# Patient Record
Sex: FEMALE | Race: WHITE | NOT HISPANIC OR LATINO | Employment: FULL TIME | URBAN - METROPOLITAN AREA
[De-identification: names, ages, dates, MRNs, and addresses within clinical notes are randomized per-mention and may not be internally consistent; named-entity substitution may affect disease eponyms.]

---

## 2018-01-21 ENCOUNTER — OFFICE VISIT (OUTPATIENT)
Dept: URGENT CARE | Facility: CLINIC | Age: 38
End: 2018-01-21
Payer: COMMERCIAL

## 2018-01-21 VITALS
RESPIRATION RATE: 18 BRPM | WEIGHT: 160 LBS | TEMPERATURE: 98 F | SYSTOLIC BLOOD PRESSURE: 107 MMHG | BODY MASS INDEX: 25.11 KG/M2 | OXYGEN SATURATION: 100 % | HEIGHT: 67 IN | DIASTOLIC BLOOD PRESSURE: 69 MMHG | HEART RATE: 89 BPM

## 2018-01-21 DIAGNOSIS — B34.9 VIRAL SYNDROME: ICD-10-CM

## 2018-01-21 DIAGNOSIS — R50.9 FEVER, UNSPECIFIED FEVER CAUSE: Primary | ICD-10-CM

## 2018-01-21 LAB
CTP QC/QA: YES
FLUAV AG NPH QL: NEGATIVE
FLUBV AG NPH QL: NEGATIVE

## 2018-01-21 PROCEDURE — 99203 OFFICE O/P NEW LOW 30 MIN: CPT | Mod: S$GLB,,, | Performed by: EMERGENCY MEDICINE

## 2018-01-21 PROCEDURE — 87804 INFLUENZA ASSAY W/OPTIC: CPT | Mod: 59,QW,S$GLB, | Performed by: EMERGENCY MEDICINE

## 2018-01-21 RX ORDER — ACETAMINOPHEN 325 MG/1
325 TABLET ORAL EVERY 6 HOURS PRN
COMMUNITY

## 2018-01-21 NOTE — PROGRESS NOTES
Subjective:       Patient ID: Nannette Dimas is a 37 y.o. female.    Vitals:    01/21/18 1006   BP: 107/69   Pulse: 89   Resp: 18   Temp: 98 °F (36.7 °C)       Chief Complaint: Fever (99.6 last night )    38.5 WEEKS PREGNANT, JUST WANTED TO MAKE SURE NOT FLU, AS SHE WOKE UP WITH 99.5 DEGREE TEMP, MILD BODY ACHES, NOT MUCH ELSE      Fever    This is a new problem. The current episode started in the past 7 days. The problem occurs constantly. The problem has been gradually worsening. The maximum temperature noted was 99 to 99.9 F. Associated symptoms include headaches and muscle aches. Pertinent negatives include no abdominal pain, chest pain, congestion, coughing, ear pain, nausea, sore throat or wheezing. She has tried acetaminophen for the symptoms. The treatment provided mild relief.     Review of Systems   Constitution: Positive for chills, fever, weakness and malaise/fatigue.   HENT: Negative for congestion, ear pain, hoarse voice and sore throat.    Eyes: Negative for discharge and redness.   Cardiovascular: Negative for chest pain, dyspnea on exertion and leg swelling.   Respiratory: Negative for cough, shortness of breath, sputum production and wheezing.    Musculoskeletal: Negative for myalgias.   Gastrointestinal: Negative for abdominal pain and nausea.   Neurological: Positive for headaches.       Objective:      Physical Exam   Constitutional: She is oriented to person, place, and time. She appears well-developed and well-nourished. She is cooperative.  Non-toxic appearance. She does not appear ill. No distress.   HENT:   Head: Normocephalic and atraumatic.   Right Ear: Hearing, tympanic membrane, external ear and ear canal normal.   Left Ear: Hearing, tympanic membrane, external ear and ear canal normal.   Nose: Nose normal. No mucosal edema, rhinorrhea or nasal deformity. No epistaxis. Right sinus exhibits no maxillary sinus tenderness and no frontal sinus tenderness. Left sinus exhibits no  maxillary sinus tenderness and no frontal sinus tenderness.   Mouth/Throat: Uvula is midline, oropharynx is clear and moist and mucous membranes are normal. No trismus in the jaw. Normal dentition. No uvula swelling. No posterior oropharyngeal erythema.   Eyes: Conjunctivae and lids are normal. No scleral icterus.   Sclera clear bilat   Neck: Trachea normal, full passive range of motion without pain and phonation normal. Neck supple.   Cardiovascular: Normal rate, regular rhythm, normal heart sounds, intact distal pulses and normal pulses.    Pulmonary/Chest: Effort normal and breath sounds normal. No respiratory distress.   Abdominal: Soft. Normal appearance and bowel sounds are normal. She exhibits no distension. There is no tenderness.   Musculoskeletal: Normal range of motion. She exhibits no edema or deformity.   Neurological: She is alert and oriented to person, place, and time. She exhibits normal muscle tone. Coordination normal.   Skin: Skin is warm, dry and intact. She is not diaphoretic. No pallor.   Psychiatric: She has a normal mood and affect. Her speech is normal and behavior is normal. Judgment and thought content normal. Cognition and memory are normal.   Nursing note and vitals reviewed.        Office Visit on 01/21/2018   Component Date Value Ref Range Status    Rapid Influenza A Ag 01/21/2018 Negative  Negative Final    Rapid Influenza B Ag 01/21/2018 Negative  Negative Final     Acceptable 01/21/2018 Yes   Final       Assessment:       1. Fever, unspecified fever cause    2. Viral syndrome        Plan:       Nannette was seen today for fever.    Diagnoses and all orders for this visit:    Fever, unspecified fever cause  -     POCT Influenza A/B    Viral syndrome          Patient Instructions     Viral Syndrome (Adult)  A viral illness may cause a number of symptoms. The symptoms depend on the part of the body that the virus affects. If it settles in your nose, throat, and  "lungs, it may cause cough, sore throat, congestion, and sometimes headache. If it settles in your stomach and intestinal tract, it may cause vomiting and diarrhea. Sometimes it causes vague symptoms like "aching all over," feeling tired, loss of appetite, or fever.  A viral illness usually lasts 1 to 2 weeks, but sometimes it lasts longer. In some cases, a more serious infection can look like a viral syndrome in the first few days of the illness. You may need another exam and additional tests to know the difference. Watch for the warning signs listed below.  Home care  Follow these guidelines for taking care of yourself at home:  · If symptoms are severe, rest at home for the first 2 to 3 days.  · Stay away from cigarette smoke - both your smoke and the smoke from others.  · You may use over-the-counter acetaminophen or ibuprofen for fever, muscle aching, and headache, unless another medicine was prescribed for this. If you have chronic liver or kidney disease or ever had a stomach ulcer or GI bleeding, talk with your doctor before using these medicines. No one who is younger than 18 and ill with a fever should take aspirin. It may cause severe disease or death.  · Your appetite may be poor, so a light diet is fine. Avoid dehydration by drinking 8 to 12 8-ounce glasses of fluids each day. This may include water; orange juice; lemonade; apple, grape, and cranberry juice; clear fruit drinks; electrolyte replacement and sports drinks; and decaffeinated teas and coffee. If you have been diagnosed with a kidney disease, ask your doctor how much and what types of fluids you should drink to prevent dehydration. If you have kidney disease, drinking too much fluid can cause it build up in the your body and be dangerous to your health.  · Over-the-counter remedies won't shorten the length of the illness but may be helpful for cough, sore throat; and nasal and sinus congestion. Don't use decongestants if you have high blood " pressure.  Follow-up care  Follow up with your healthcare provider if you do not improve over the next week.  Call 911  Get emergency medical care if any of the following occur:  · Convulsion  · Feeling weak, dizzy, or like you are going to faint  · Chest pain, shortness of breath, wheezing, or difficulty breathing  When to seek medical advice  Call your healthcare provider right away if any of these occur:  · Cough with lots of colored sputum (mucus) or blood in your sputum  · Chest pain, shortness of breath, wheezing, or difficulty breathing  · Severe headache; face, neck, or ear pain  · Severe, constant pain in the lower right side of your belly (abdominal)  · Continued vomiting (cant keep liquids down)  · Frequent diarrhea (more than 5 times a day); blood (red or black color) or mucus in diarrhea  · Feeling weak, dizzy, or like you are going to faint  · Extreme thirst  · Fever of 100.4°F (38°C) or higher, or as directed by your healthcare provider  Date Last Reviewed: 9/25/2015 © 2000-2017 [employer]. [address] [place] [address], [place] [id number]. All rights reserved. This information is not intended as a substitute for professional medical care. Always follow your healthcare professional's instructions.      REST  HYDRATE  TYLENOL FOR ACHES, LOW GRADE TEMP  APPROVED MEDS BY OBGYN LIKE CLARITIN OR MUCINEX FOR CONGESTION  FLU SWAB NEGATIVE  RETURN FOR ANY CONCERNS OR PROBLEMS  SEE VIRAL SYNDROME SHEET

## 2018-01-21 NOTE — PATIENT INSTRUCTIONS
"  Viral Syndrome (Adult)  A viral illness may cause a number of symptoms. The symptoms depend on the part of the body that the virus affects. If it settles in your nose, throat, and lungs, it may cause cough, sore throat, congestion, and sometimes headache. If it settles in your stomach and intestinal tract, it may cause vomiting and diarrhea. Sometimes it causes vague symptoms like "aching all over," feeling tired, loss of appetite, or fever.  A viral illness usually lasts 1 to 2 weeks, but sometimes it lasts longer. In some cases, a more serious infection can look like a viral syndrome in the first few days of the illness. You may need another exam and additional tests to know the difference. Watch for the warning signs listed below.  Home care  Follow these guidelines for taking care of yourself at home:  · If symptoms are severe, rest at home for the first 2 to 3 days.  · Stay away from cigarette smoke - both your smoke and the smoke from others.  · You may use over-the-counter acetaminophen or ibuprofen for fever, muscle aching, and headache, unless another medicine was prescribed for this. If you have chronic liver or kidney disease or ever had a stomach ulcer or GI bleeding, talk with your doctor before using these medicines. No one who is younger than 18 and ill with a fever should take aspirin. It may cause severe disease or death.  · Your appetite may be poor, so a light diet is fine. Avoid dehydration by drinking 8 to 12 8-ounce glasses of fluids each day. This may include water; orange juice; lemonade; apple, grape, and cranberry juice; clear fruit drinks; electrolyte replacement and sports drinks; and decaffeinated teas and coffee. If you have been diagnosed with a kidney disease, ask your doctor how much and what types of fluids you should drink to prevent dehydration. If you have kidney disease, drinking too much fluid can cause it build up in the your body and be dangerous to your " health.  · Over-the-counter remedies won't shorten the length of the illness but may be helpful for cough, sore throat; and nasal and sinus congestion. Don't use decongestants if you have high blood pressure.  Follow-up care  Follow up with your healthcare provider if you do not improve over the next week.  Call 911  Get emergency medical care if any of the following occur:  · Convulsion  · Feeling weak, dizzy, or like you are going to faint  · Chest pain, shortness of breath, wheezing, or difficulty breathing  When to seek medical advice  Call your healthcare provider right away if any of these occur:  · Cough with lots of colored sputum (mucus) or blood in your sputum  · Chest pain, shortness of breath, wheezing, or difficulty breathing  · Severe headache; face, neck, or ear pain  · Severe, constant pain in the lower right side of your belly (abdominal)  · Continued vomiting (cant keep liquids down)  · Frequent diarrhea (more than 5 times a day); blood (red or black color) or mucus in diarrhea  · Feeling weak, dizzy, or like you are going to faint  · Extreme thirst  · Fever of 100.4°F (38°C) or higher, or as directed by your healthcare provider  Date Last Reviewed: 9/25/2015  © 0847-8976 [employer]. [address] [place] [address], [place] [address]. All rights reserved. This information is not intended as a substitute for professional medical care. Always follow your healthcare professional's instructions.      REST  HYDRATE  TYLENOL FOR ACHES, LOW GRADE TEMP  APPROVED MEDS BY OBGYN LIKE CLARITIN OR MUCINEX FOR CONGESTION  FLU SWAB NEGATIVE  RETURN FOR ANY CONCERNS OR PROBLEMS  SEE VIRAL SYNDROME SHEET

## 2018-01-24 ENCOUNTER — OFFICE VISIT (OUTPATIENT)
Dept: URGENT CARE | Facility: CLINIC | Age: 38
End: 2018-01-24
Payer: COMMERCIAL

## 2018-01-24 VITALS
HEART RATE: 103 BPM | WEIGHT: 160 LBS | OXYGEN SATURATION: 97 % | DIASTOLIC BLOOD PRESSURE: 79 MMHG | RESPIRATION RATE: 16 BRPM | SYSTOLIC BLOOD PRESSURE: 112 MMHG | BODY MASS INDEX: 25.11 KG/M2 | HEIGHT: 67 IN | TEMPERATURE: 97 F

## 2018-01-24 DIAGNOSIS — J10.1 INFLUENZA B: Primary | ICD-10-CM

## 2018-01-24 LAB
CTP QC/QA: YES
FLUAV AG NPH QL: NEGATIVE
FLUBV AG NPH QL: POSITIVE

## 2018-01-24 PROCEDURE — 99214 OFFICE O/P EST MOD 30 MIN: CPT | Mod: S$GLB,,, | Performed by: EMERGENCY MEDICINE

## 2018-01-24 PROCEDURE — 87804 INFLUENZA ASSAY W/OPTIC: CPT | Mod: 59,QW,S$GLB, | Performed by: EMERGENCY MEDICINE

## 2018-01-24 RX ORDER — TETANUS TOXOID, REDUCED DIPHTHERIA TOXOID AND ACELLULAR PERTUSSIS VACCINE, ADSORBED 5; 2.5; 8; 8; 2.5 [IU]/.5ML; [IU]/.5ML; UG/.5ML; UG/.5ML; UG/.5ML
SUSPENSION INTRAMUSCULAR
Refills: 0 | COMMUNITY
Start: 2018-01-02

## 2018-01-24 RX ORDER — OSELTAMIVIR PHOSPHATE 75 MG/1
75 CAPSULE ORAL 2 TIMES DAILY
Qty: 10 CAPSULE | Refills: 0 | Status: SHIPPED | OUTPATIENT
Start: 2018-01-24 | End: 2018-01-29

## 2018-01-24 NOTE — PATIENT INSTRUCTIONS
SEE FLU SHEET  FLU B POSITIVE  TYLENOL 650 MG EVERY 4-6 HOURS FOR FEVER/ACHES  CLARITIN FOR CONGESTION  OVER THE COUNTER MUCINEX  TWICE DAILY FOR COUGH  TAMIFLU RX  RETURN FOR ANY CONCERNS OR PROBLEMS  SEE FLU SHEET        Influenza (Adult)    Influenza is also called the flu. It is a viral illness that affects the air passages of your lungs. It is different from the common cold. The flu can easily be passed from one to person to another. It may be spread through the air by coughing and sneezing. Or it can be spread by touching the sick person and then touching your own eyes, nose, or mouth.  The flu starts 1 to 3 days after you are exposed to the flu virus. It may last for 1 to 2 weeks but many people feel tired or fatigued for many weeks afterward. You usually dont need to take antibiotics unless you have a complication. This might be an ear or sinus infection or pneumonia.  Symptoms of the flu may be mild or severe. They can include extreme tiredness (wanting to stay in bed all day), chills, fevers, muscle aches, soreness with eye movement, headache, and a dry, hacking cough.  Home care  Follow these guidelines when caring for yourself at home:  · Avoid being around cigarette smoke, whether yours or other peoples.  · Acetaminophen or ibuprofen will help ease your fever, muscle aches, and headache. Dont give aspirin to anyone younger than 18 who has the flu. Aspirin can harm the liver.  · Nausea and loss of appetite are common with the flu. Eat light meals. Drink 6 to 8 glasses of liquids every day. Good choices are water, sport drinks, soft drinks without caffeine, juices, tea, and soup. Extra fluids will also help loosen secretions in your nose and lungs.  · Over-the-counter cold medicines will not make the flu go away faster. But the medicines may help with coughing, sore throat, and congestion in your nose and sinuses. Dont use a decongestant if you have high blood pressure.  · Stay home until your fever  has been gone for at least 24 hours without using medicine to reduce fever.  Follow-up care  Follow up with your healthcare provider, or as advised, if you are not getting better over the next week.  If you are age 65 or older, talk with your provider about getting a pneumococcal vaccine every 5 years. You should also get this vaccine if you have chronic asthma or COPD. All adults should get a flu vaccine every fall. Ask your provider about this.  When to seek medical advice  Call your healthcare provider right away if any of these occur:  · Cough with lots of colored mucus (sputum) or blood in your mucus  · Chest pain, shortness of breath, wheezing, or trouble breathing  · Severe headache, or face, neck, or ear pain  · New rash with fever  · Fever of 100.4°F (38°C) or higher, or as directed by your healthcare provider  · Confusion, behavior change, or seizure  · Severe weakness or dizziness  · You get a new fever or cough after getting better for a few days  Date Last Reviewed: 1/1/2017  © 5104-9388 [employer]. [address] [place] [address], [place] [address]. All rights reserved. This information is not intended as a substitute for professional medical care. Always follow your healthcare professional's instructions.

## 2018-01-24 NOTE — PROGRESS NOTES
Subjective:       Patient ID: Nannette Dimas is a 37 y.o. female.    Vitals:    01/24/18 0905   BP: 112/79   Pulse: 103   Resp: 16   Temp: 96.9 °F (36.1 °C)       Chief Complaint: Sinus Problem and Sore Throat    Patient came in Sunday and had flu test that was negative.Patient reports 5 days of nasal congestion.Patient reports 2 days sore throat.PATIENT IS 38 WEEKS PREGNANT. STATES NOW WITH CONGESTION, EAR PAIN, AND HAD TEMP .9. TESTED NEGATIVE FOR FLU 2 DAYS AGO.       Sinus Problem   This is a new problem. Episode onset: 5 DAYS. The problem has been gradually worsening since onset. The maximum temperature recorded prior to her arrival was 100.4 - 100.9 F. Her pain is at a severity of 7/10. The pain is moderate. Associated symptoms include chills, congestion (nasal), headaches, sinus pressure and a sore throat. Pertinent negatives include no coughing, ear pain, hoarse voice or shortness of breath. Treatments tried: MUCINEX Tylenol taken at 4:00am today. The treatment provided mild relief.   Sore Throat    This is a new problem. Episode onset: 2 days. The problem has been gradually worsening. Neither side of throat is experiencing more pain than the other. The maximum temperature recorded prior to her arrival was 101 - 101.9 F. The pain is at a severity of 6/10. The pain is moderate. Associated symptoms include congestion (nasal), headaches and trouble swallowing. Pertinent negatives include no abdominal pain, coughing, ear pain, hoarse voice or shortness of breath. She has had no exposure to strep or mono. She has tried gargles (mucinex tylenol) for the symptoms. The treatment provided mild relief.     Review of Systems   Constitution: Positive for chills and fever (fever at home yester 100.9). Negative for malaise/fatigue.        Body aches   HENT: Positive for congestion (nasal), sinus pressure, sore throat and trouble swallowing. Negative for ear pain and hoarse voice.    Eyes: Negative for  discharge and redness.   Cardiovascular: Negative for chest pain, dyspnea on exertion and leg swelling.   Respiratory: Negative for cough, shortness of breath, sputum production and wheezing.    Musculoskeletal: Negative for myalgias.   Gastrointestinal: Negative for abdominal pain and nausea.   Neurological: Positive for headaches.       Objective:      Physical Exam   Constitutional: She is oriented to person, place, and time. She appears well-developed and well-nourished. She is cooperative.  Non-toxic appearance. She does not appear ill. No distress.   HENT:   Head: Normocephalic and atraumatic.   Right Ear: Hearing, tympanic membrane, external ear and ear canal normal.   Left Ear: Hearing, tympanic membrane, external ear and ear canal normal.   Nose: No mucosal edema, rhinorrhea or nasal deformity. No epistaxis. Right sinus exhibits no maxillary sinus tenderness and no frontal sinus tenderness. Left sinus exhibits no maxillary sinus tenderness and no frontal sinus tenderness.   Mouth/Throat: Uvula is midline, oropharynx is clear and moist and mucous membranes are normal. No trismus in the jaw. Normal dentition. No uvula swelling. No posterior oropharyngeal erythema.   CLOUDY FLUID LEFT EAR, SINUS CONGESTION, RHINORRHEA, SINUS TTP   Eyes: Conjunctivae and lids are normal.   Sclera clear bilat   Neck: Trachea normal, full passive range of motion without pain and phonation normal. Neck supple.   Cardiovascular: Normal rate, regular rhythm, normal heart sounds, intact distal pulses and normal pulses.    Pulmonary/Chest: Effort normal and breath sounds normal. No respiratory distress.   INTERMITTENT COUGH   Abdominal: Soft. Normal appearance and bowel sounds are normal. There is no tenderness.   Musculoskeletal: Normal range of motion. She exhibits no edema or deformity.   Neurological: She is alert and oriented to person, place, and time. She exhibits normal muscle tone. Coordination normal.   Skin: Skin is warm,  dry and intact. She is not diaphoretic. No pallor.   Psychiatric: She has a normal mood and affect. Her speech is normal and behavior is normal. Cognition and memory are normal.   Nursing note and vitals reviewed.      REPEAT FLU SWAB TODAY POSITIVE FOR B. GIVEN THAT SHE IS PREGNANT WILL TREAT EMPIRICALLY  Assessment:       1. Influenza B        Plan:       Nannette was seen today for sinus problem and sore throat.    Diagnoses and all orders for this visit:    Influenza B  -     POCT Influenza A/B    Other orders  -     oseltamivir (TAMIFLU) 75 MG capsule; Take 1 capsule (75 mg total) by mouth 2 (two) times daily.          Patient Instructions   SEE FLU SHEET  FLU B POSITIVE  TYLENOL 650 MG EVERY 4-6 HOURS FOR FEVER/ACHES  CLARITIN FOR CONGESTION  OVER THE COUNTER MUCINEX  TWICE DAILY FOR COUGH  TAMIFLU RX  RETURN FOR ANY CONCERNS OR PROBLEMS  SEE FLU SHEET        Influenza (Adult)    Influenza is also called the flu. It is a viral illness that affects the air passages of your lungs. It is different from the common cold. The flu can easily be passed from one to person to another. It may be spread through the air by coughing and sneezing. Or it can be spread by touching the sick person and then touching your own eyes, nose, or mouth.  The flu starts 1 to 3 days after you are exposed to the flu virus. It may last for 1 to 2 weeks but many people feel tired or fatigued for many weeks afterward. You usually dont need to take antibiotics unless you have a complication. This might be an ear or sinus infection or pneumonia.  Symptoms of the flu may be mild or severe. They can include extreme tiredness (wanting to stay in bed all day), chills, fevers, muscle aches, soreness with eye movement, headache, and a dry, hacking cough.  Home care  Follow these guidelines when caring for yourself at home:  · Avoid being around cigarette smoke, whether yours or other peoples.  · Acetaminophen or ibuprofen will help ease your  fever, muscle aches, and headache. Dont give aspirin to anyone younger than 18 who has the flu. Aspirin can harm the liver.  · Nausea and loss of appetite are common with the flu. Eat light meals. Drink 6 to 8 glasses of liquids every day. Good choices are water, sport drinks, soft drinks without caffeine, juices, tea, and soup. Extra fluids will also help loosen secretions in your nose and lungs.  · Over-the-counter cold medicines will not make the flu go away faster. But the medicines may help with coughing, sore throat, and congestion in your nose and sinuses. Dont use a decongestant if you have high blood pressure.  · Stay home until your fever has been gone for at least 24 hours without using medicine to reduce fever.  Follow-up care  Follow up with your healthcare provider, or as advised, if you are not getting better over the next week.  If you are age 65 or older, talk with your provider about getting a pneumococcal vaccine every 5 years. You should also get this vaccine if you have chronic asthma or COPD. All adults should get a flu vaccine every fall. Ask your provider about this.  When to seek medical advice  Call your healthcare provider right away if any of these occur:  · Cough with lots of colored mucus (sputum) or blood in your mucus  · Chest pain, shortness of breath, wheezing, or trouble breathing  · Severe headache, or face, neck, or ear pain  · New rash with fever  · Fever of 100.4°F (38°C) or higher, or as directed by your healthcare provider  · Confusion, behavior change, or seizure  · Severe weakness or dizziness  · You get a new fever or cough after getting better for a few days  Date Last Reviewed: 1/1/2017  © 0958-9322 [employer]. [address] [place] [address], [place] [address]. All rights reserved. This information is not intended as a substitute for professional medical care. Always follow your healthcare professional's instructions.

## 2018-11-08 ENCOUNTER — OFFICE VISIT (OUTPATIENT)
Dept: DERMATOLOGY | Facility: CLINIC | Age: 38
End: 2018-11-08
Payer: COMMERCIAL

## 2018-11-08 DIAGNOSIS — L70.0 ACNE VULGARIS: ICD-10-CM

## 2018-11-08 DIAGNOSIS — B07.9 VIRAL WARTS, UNSPECIFIED TYPE: Primary | ICD-10-CM

## 2018-11-08 DIAGNOSIS — L81.4 LENTIGO: ICD-10-CM

## 2018-11-08 PROCEDURE — 17110 DESTRUCTION B9 LES UP TO 14: CPT | Mod: S$GLB,,, | Performed by: DERMATOLOGY

## 2018-11-08 PROCEDURE — 99213 OFFICE O/P EST LOW 20 MIN: CPT | Mod: 25,S$GLB,, | Performed by: DERMATOLOGY

## 2018-11-08 NOTE — PROGRESS NOTES
Subjective:       Patient ID:  Nannette Dimas is a 38 y.o. female who presents for   Chief Complaint   Patient presents with    Warts     L thumb    Spot     face     This is a previous patient of mine who is here today for evaluation of a new spot on her face and treatment of a recently recurrent wart. She also complains of acne that has worsened since her last child 10 months ago. She is using [employer] Un[employer]ish which helps some.      Warts  - Follow-up  Symptom course: worsening  Affected locations: left fingers  Duration: 1 year  Signs / symptoms: rough and growing  Severity: mild to moderate  Timing: constant  Aggravated by: nothing  Treatments tried: cryo in past.  Improvement on treatment: significant    Spot  - Initial  Affected locations: face  Duration: 4 weeks  Signs / symptoms: asymptomatic  Severity: mild  Timing: constant  Aggravated by: nothing  Relieving factors/Treatments tried: nothing  Improvement on treatment: no relief      Review of Systems   Musculoskeletal: Negative for joint swelling and arthralgias.   Skin: Negative for recent sunburn.   Hematologic/Lymphatic: Does not bruise/bleed easily.        Objective:    Physical Exam   Constitutional: She appears well-developed and well-nourished. No distress.   HENT:   Mouth/Throat: Lips normal.    Eyes: Lids are normal.  No conjunctival no injection.   Neurological: She is alert and oriented to person, place, and time. She is not disoriented.   Psychiatric: She has a normal mood and affect.   Skin:   Areas Examined (abnormalities noted in diagram):   Head / Face Inspection Performed  Neck Inspection Performed  RUE Inspected  LUE Inspection Performed                  Diagram Legend     Erythematous scaling macule/papule c/w actinic keratosis       Vascular papule c/w angioma      Pigmented verrucoid papule/plaque c/w seborrheic keratosis      Yellow umbilicated papule c/w sebaceous hyperplasia      Irregularly shaped tan macule c/w lentigo      1-2 mm smooth white papules consistent with Milia      Movable subcutaneous cyst with punctum c/w epidermal inclusion cyst      Subcutaneous movable cyst c/w pilar cyst      Firm pink to brown papule c/w dermatofibroma      Pedunculated fleshy papule(s) c/w skin tag(s)      Evenly pigmented macule c/w junctional nevus     Mildly variegated pigmented, slightly irregular-bordered macule c/w mildly atypical nevus      Flesh colored to evenly pigmented papule c/w intradermal nevus       Pink pearly papule/plaque c/w basal cell carcinoma      Erythematous hyperkeratotic cursted plaque c/w SCC      Surgical scar with no sign of skin cancer recurrence      Open and closed comedones      Inflammatory papules and pustules      Verrucoid papule consistent consistent with wart     Erythematous eczematous patches and plaques     Dystrophic onycholytic nail with subungual debris c/w onychomycosis     Umbilicated papule    Erythematous-base heme-crusted tan verrucoid plaque consistent with inflamed seborrheic keratosis     Erythematous Silvery Scaling Plaque c/w Psoriasis     See annotation      Assessment / Plan:        Viral warts, unspecified type  Cryosurgery procedure note:  Verbal consent obtained from patient, including but not limited to risk of hypopigmentation/hyperpigmentation, scar, recurrence of lesion, and need for multiple treatments. After paring lesions with a #15 blade, liquid nitrogen cryosurgery was applied to 1 lesion(s) to produce a freeze injury. 2 consecutive freeze-thaw cycle(s) were applied to each lesion. Counseled patient that blisters (possibly blood blisters) may form and instructed patient on wound care with gentle cleansing and use of Vaseline ointment to keep moist until healed. Handout was provided, and patient was instructed to return to clinic in 3-4 weeks if lesions do not completely resolve.    Lentigo  These are benign sun spots which should be monitored for changes. Daily sun protection  will reduce the number of new lesions.   Patient instructed in importance of daily broad-spectrum sunscreen use with SPF of at least 30. Sun avoidance and topical protection/protective clothing discussed.    Acne vulgaris  Recommended a pea-sized amount of Differin gel to entire face qhs.    Follow-up in about 4 weeks (around 12/6/2018), or if symptoms worsen or fail to improve.

## 2019-05-07 ENCOUNTER — OFFICE VISIT (OUTPATIENT)
Dept: URGENT CARE | Facility: CLINIC | Age: 39
End: 2019-05-07
Payer: COMMERCIAL

## 2019-05-07 VITALS
HEART RATE: 68 BPM | WEIGHT: 130 LBS | RESPIRATION RATE: 17 BRPM | TEMPERATURE: 98 F | OXYGEN SATURATION: 99 % | SYSTOLIC BLOOD PRESSURE: 115 MMHG | DIASTOLIC BLOOD PRESSURE: 73 MMHG | BODY MASS INDEX: 20.4 KG/M2 | HEIGHT: 67 IN

## 2019-05-07 DIAGNOSIS — R35.0 URINE FREQUENCY: ICD-10-CM

## 2019-05-07 DIAGNOSIS — N39.0 ACUTE UTI: Primary | ICD-10-CM

## 2019-05-07 LAB
B-HCG UR QL: NEGATIVE
BILIRUB UR QL STRIP: POSITIVE
CTP QC/QA: YES
GLUCOSE UR QL STRIP: POSITIVE
KETONES UR QL STRIP: NEGATIVE
LEUKOCYTE ESTERASE UR QL STRIP: NEGATIVE
PH, POC UA: 7 (ref 5–8)
POC BLOOD, URINE: NEGATIVE
POC NITRATES, URINE: POSITIVE
PROT UR QL STRIP: NEGATIVE
SP GR UR STRIP: 1.02 (ref 1–1.03)
UROBILINOGEN UR STRIP-ACNC: POSITIVE (ref 0.1–1.1)

## 2019-05-07 PROCEDURE — 81025 POCT URINE PREGNANCY: ICD-10-PCS | Mod: S$GLB,,, | Performed by: PHYSICIAN ASSISTANT

## 2019-05-07 PROCEDURE — 81025 URINE PREGNANCY TEST: CPT | Mod: S$GLB,,, | Performed by: PHYSICIAN ASSISTANT

## 2019-05-07 PROCEDURE — 99214 OFFICE O/P EST MOD 30 MIN: CPT | Mod: 25,S$GLB,, | Performed by: PHYSICIAN ASSISTANT

## 2019-05-07 PROCEDURE — 81003 URINALYSIS AUTO W/O SCOPE: CPT | Mod: QW,S$GLB,, | Performed by: PHYSICIAN ASSISTANT

## 2019-05-07 PROCEDURE — 99214 PR OFFICE/OUTPT VISIT, EST, LEVL IV, 30-39 MIN: ICD-10-PCS | Mod: 25,S$GLB,, | Performed by: PHYSICIAN ASSISTANT

## 2019-05-07 PROCEDURE — 81003 POCT URINALYSIS, DIPSTICK, AUTOMATED, W/O SCOPE: ICD-10-PCS | Mod: QW,S$GLB,, | Performed by: PHYSICIAN ASSISTANT

## 2019-05-07 RX ORDER — CEPHALEXIN 500 MG/1
500 CAPSULE ORAL EVERY 12 HOURS
Qty: 14 CAPSULE | Refills: 0 | Status: SHIPPED | OUTPATIENT
Start: 2019-05-07 | End: 2019-05-14

## 2019-05-07 RX ORDER — PHENAZOPYRIDINE HYDROCHLORIDE 200 MG/1
200 TABLET, FILM COATED ORAL
Qty: 12 TABLET | Refills: 0 | Status: SHIPPED | OUTPATIENT
Start: 2019-05-07 | End: 2019-05-11

## 2019-05-07 NOTE — PROGRESS NOTES
"Subjective:       Patient ID: Nannette Dimas is a 38 y.o. female.    Vitals:  height is 5' 7" (1.702 m) and weight is 59 kg (130 lb). Her temperature is 97.9 °F (36.6 °C). Her blood pressure is 115/73 and her pulse is 68. Her respiration is 17 and oxygen saturation is 99%.     Chief Complaint: Cystitis    Urinary Tract Infection    The current episode started in the past 7 days. The problem has been unchanged. The quality of the pain is described as burning. She is sexually active. Associated symptoms include frequency and urgency. Pertinent negatives include no chills, hematuria, nausea, vomiting or rash. She has tried home medications for the symptoms.       Constitution: Negative for chills and fever.   Neck: Negative for painful lymph nodes.   Gastrointestinal: Negative for abdominal pain, nausea and vomiting.   Genitourinary: Positive for frequency and urgency. Negative for dysuria, urine decreased, hematuria, history of kidney stones, painful menstruation, irregular menstruation, missed menses, heavy menstrual bleeding, ovarian cysts, genital trauma, vaginal pain, vaginal discharge, vaginal bleeding, vaginal odor, painful intercourse, genital sore, painful ejaculation and pelvic pain.   Musculoskeletal: Negative for back pain.   Skin: Negative for rash and lesion.   Hematologic/Lymphatic: Negative for swollen lymph nodes.       Objective:      Physical Exam   Constitutional: She is oriented to person, place, and time. She appears well-developed and well-nourished. She is cooperative.  Non-toxic appearance. She does not appear ill. No distress.   HENT:   Head: Normocephalic and atraumatic.   Right Ear: Hearing, tympanic membrane, external ear and ear canal normal.   Left Ear: Hearing, tympanic membrane, external ear and ear canal normal.   Nose: Nose normal. No mucosal edema, rhinorrhea or nasal deformity. No epistaxis. Right sinus exhibits no maxillary sinus tenderness and no frontal sinus tenderness. " Left sinus exhibits no maxillary sinus tenderness and no frontal sinus tenderness.   Mouth/Throat: Uvula is midline, oropharynx is clear and moist and mucous membranes are normal. No trismus in the jaw. Normal dentition. No uvula swelling. No posterior oropharyngeal erythema.   Eyes: Conjunctivae and lids are normal. Right eye exhibits no discharge. Left eye exhibits no discharge. No scleral icterus.   Sclera clear bilat   Neck: Trachea normal, normal range of motion, full passive range of motion without pain and phonation normal. Neck supple.   Cardiovascular: Normal rate, regular rhythm, normal heart sounds, intact distal pulses and normal pulses.   Pulmonary/Chest: Effort normal and breath sounds normal. No respiratory distress.   Abdominal: Soft. Normal appearance and bowel sounds are normal. She exhibits no distension, no pulsatile midline mass and no mass. There is no tenderness. There is no rigidity, no rebound, no guarding, no CVA tenderness, no tenderness at McBurney's point and negative Arroyo's sign.   Musculoskeletal: Normal range of motion. She exhibits no edema or deformity.   Neurological: She is alert and oriented to person, place, and time. She exhibits normal muscle tone. Coordination normal.   Skin: Skin is warm, dry and intact. She is not diaphoretic. No pallor.   Psychiatric: She has a normal mood and affect. Her speech is normal and behavior is normal. Judgment and thought content normal. Cognition and memory are normal.   Nursing note and vitals reviewed.      Assessment:       1. Acute UTI    2. Urine frequency        Plan:         Acute UTI    Urine frequency  -     POCT Urinalysis, Dipstick, Automated, W/O Scope  -     POCT urine pregnancy    Other orders  -     phenazopyridine (PYRIDIUM) 200 MG tablet; Take 1 tablet (200 mg total) by mouth 3 (three) times daily with meals. for 4 days  Dispense: 12 tablet; Refill: 0  -     cephALEXin (KEFLEX) 500 MG capsule; Take 1 capsule (500 mg total) by  mouth every 12 (twelve) hours. for 7 days  Dispense: 14 capsule; Refill: 0

## 2019-05-07 NOTE — PATIENT INSTRUCTIONS
Thank you for coming to our Urgent Care today. It is important to remember that some problems are difficult to diagnose and may not be found during your first visit. Be sure to follow up with your primary care doctor.    Return to the Urgent Care with any questions/concerns or new/concerning symptoms. If symptoms worsening or fail to improve. Do not drive or make any important decisions for 24 hours if you have received any pain medications, sedatives or mood altering drugs during your Urgent Care visit.      Understanding Urinary Tract Infections (UTIs)  Most UTIs are caused by bacteria, although they may also be caused by viruses or fungi. Bacteria from the bowel are the most common source of infection. The infection may start because of any of the following:  · Sexual activity. During sex, bacteria can travel from the penis, vagina, or rectum into the urethra.   · Bacteria on the skin outside the rectum may travel into the urethra. This is more common in women since the rectum and urethra are closer to each other than in men. Wiping from front to back after using the toilet and keeping the area clean can help prevent germs from getting to the urethra.  · Blockage of urine flow through the urinary tract. If urine sits too long, germs may start to grow out of control.      Parts of the urinary tract  The infection can occur in any part of the urinary tract.  · The kidneys collect and store urine.  · The ureters carry urine from the kidneys to the bladder.  · The bladder holds urine until you are ready to let it out.  · The urethra carries urine from the bladder out of the body. It is shorter in women, so bacteria can move through it more easily. The urethra is longer in men, so a UTI is less likely to reach the bladder or kidneys in men.  Date Last Reviewed: 1/1/2017  © 4722-7499 [employer]. [address] [place] [address], [place] [address]. All rights reserved. This information is not intended as a  substitute for professional medical care. Always follow your healthcare professional's instructions.

## 2019-11-06 ENCOUNTER — OFFICE VISIT (OUTPATIENT)
Dept: URGENT CARE | Facility: CLINIC | Age: 39
End: 2019-11-06
Payer: COMMERCIAL

## 2019-11-06 VITALS
RESPIRATION RATE: 18 BRPM | BODY MASS INDEX: 20.4 KG/M2 | WEIGHT: 130 LBS | DIASTOLIC BLOOD PRESSURE: 78 MMHG | TEMPERATURE: 98 F | OXYGEN SATURATION: 98 % | HEART RATE: 75 BPM | SYSTOLIC BLOOD PRESSURE: 121 MMHG | HEIGHT: 67 IN

## 2019-11-06 DIAGNOSIS — R07.9 CHEST PAIN, UNSPECIFIED TYPE: Primary | ICD-10-CM

## 2019-11-06 PROCEDURE — 93010 EKG 12-LEAD: ICD-10-PCS | Mod: S$GLB,,, | Performed by: INTERNAL MEDICINE

## 2019-11-06 PROCEDURE — 99213 PR OFFICE/OUTPT VISIT, EST, LEVL III, 20-29 MIN: ICD-10-PCS | Mod: S$GLB,,, | Performed by: NURSE PRACTITIONER

## 2019-11-06 PROCEDURE — 99213 OFFICE O/P EST LOW 20 MIN: CPT | Mod: S$GLB,,, | Performed by: NURSE PRACTITIONER

## 2019-11-06 PROCEDURE — 93005 EKG 12-LEAD: ICD-10-PCS | Mod: S$GLB,,, | Performed by: NURSE PRACTITIONER

## 2019-11-06 PROCEDURE — 93005 ELECTROCARDIOGRAM TRACING: CPT | Mod: S$GLB,,, | Performed by: NURSE PRACTITIONER

## 2019-11-06 PROCEDURE — 93010 ELECTROCARDIOGRAM REPORT: CPT | Mod: S$GLB,,, | Performed by: INTERNAL MEDICINE

## 2019-11-06 RX ORDER — ASPIRIN 325 MG
325 TABLET ORAL
Status: COMPLETED | OUTPATIENT
Start: 2019-11-06 | End: 2019-11-06

## 2019-11-06 RX ADMIN — Medication 325 MG: at 07:11

## 2019-11-07 NOTE — PROGRESS NOTES
"Subjective:       Patient ID: Nannette Dimas is a 39 y.o. female.    Vitals:  height is 5' 7" (1.702 m) and weight is 59 kg (130 lb). Her temperature is 97.8 °F (36.6 °C). Her blood pressure is 121/78 and her pulse is 75. Her respiration is 18 and oxygen saturation is 98%.     Chief Complaint: Chest Pain    Pt states she just ate dinner and chest started. Sharp, 6/10,  sudden onset of left sided chest pain with radiation to left arm. Symptoms lasted 20 mins. Chest pain improved to 2/10 at present.  Pt states she took two Tums and a Pepcid for heartburn.     Chest Pain    This is a new problem. The current episode started today. The onset quality is sudden. The problem occurs constantly. The problem has been unchanged. The quality of the pain is described as burning. Pertinent negatives include no abdominal pain, cough, dizziness, fever, headaches, nausea, shortness of breath, vomiting or weakness. The pain is aggravated by nothing. She has tried antacids for the symptoms. The treatment provided mild relief.       Constitution: Negative for chills, fatigue and fever.   HENT: Negative for congestion and sore throat.    Neck: Negative for painful lymph nodes.   Cardiovascular: Positive for chest pain. Negative for leg swelling.   Eyes: Negative for double vision and blurred vision.   Respiratory: Negative for cough and shortness of breath.    Gastrointestinal: Negative for abdominal pain, nausea, vomiting and diarrhea.   Genitourinary: Negative for dysuria, frequency, urgency and history of kidney stones.   Musculoskeletal: Negative for joint pain, joint swelling, muscle cramps and muscle ache.   Skin: Negative for color change, pale, rash and bruising.   Allergic/Immunologic: Negative for seasonal allergies.   Neurological: Negative for dizziness, history of vertigo, light-headedness, passing out and headaches.   Hematologic/Lymphatic: Negative for swollen lymph nodes.   Psychiatric/Behavioral: Negative for " nervous/anxious, sleep disturbance and depression. The patient is not nervous/anxious.        Objective:      Physical Exam   Constitutional: She is oriented to person, place, and time. She appears well-developed and well-nourished. She is cooperative.  Non-toxic appearance. She does not appear ill. No distress.   HENT:   Head: Normocephalic and atraumatic.   Right Ear: Hearing, tympanic membrane, external ear and ear canal normal.   Left Ear: Hearing, tympanic membrane, external ear and ear canal normal.   Nose: Nose normal. No mucosal edema, rhinorrhea or nasal deformity. No epistaxis. Right sinus exhibits no maxillary sinus tenderness and no frontal sinus tenderness. Left sinus exhibits no maxillary sinus tenderness and no frontal sinus tenderness.   Mouth/Throat: Uvula is midline, oropharynx is clear and moist and mucous membranes are normal. No trismus in the jaw. Normal dentition. No uvula swelling. No posterior oropharyngeal erythema.   Eyes: Conjunctivae and lids are normal. Right eye exhibits no discharge. Left eye exhibits no discharge. No scleral icterus.   Neck: Trachea normal, normal range of motion, full passive range of motion without pain and phonation normal. Neck supple.   Cardiovascular: Normal rate, regular rhythm, normal heart sounds, intact distal pulses and normal pulses.   Pulmonary/Chest: Effort normal and breath sounds normal. No respiratory distress. She exhibits tenderness (left upper chest wall).   Abdominal: Soft. Normal appearance and bowel sounds are normal. She exhibits no distension, no pulsatile midline mass and no mass. There is no tenderness.   Musculoskeletal: Normal range of motion. She exhibits no edema or deformity.   Neurological: She is alert and oriented to person, place, and time. She exhibits normal muscle tone. Coordination normal.   Skin: Skin is warm, dry, intact, not diaphoretic and not pale.   Psychiatric: She has a normal mood and affect. Her speech is normal and  behavior is normal. Judgment and thought content normal. Cognition and memory are normal.   Nursing note and vitals reviewed.          Assessment:       1. Chest pain, unspecified type        Plan:         Chest pain, unspecified type  -     EKG 12-lead=-NSR, No ST changes  -     aspirin tablet 325 mg        RED FLAGS/WARNING SYMPTOMS DISCUSSED WITH PATIENT THAT WOULD WARRANT EMERGENT MEDICAL ATTENTION. PATIENT VERBALIZED UNDERSTANDING.     Please follow up with your Primary care provider within 2-5 days if your signs and symptoms have not resolved or worsen.     If your condition worsens or fails to improve we recommend that you receive another evaluation at the emergency room immediately or contact your primary medical clinic to discuss your concerns.   You must understand that you have received an Urgent Care treatment only and that you may be released before all of your medical problems are known or treated. You, the patient, will arrange for follow up care as instructed.       Uncertain Causes of Chest Pain    Chest pain can happen for a number of reasons. Sometimes the cause can't be determined. If your condition does not seem serious, and your pain does not appear to be coming from your heart, your healthcare provider may recommend watching it closely. Sometimes the signs of a serious problem take more time to appear. Many problems not related to your heart can cause chest pain.These include:  · Musculoskeletal. Costochondritis, an inflammation of the tissues around the ribs that can occur from trauma or overuse injuries  · Respiratory. Pneumonia, pneumothorax, or pneumonitis (inflammation of the lining of the chest and lungs)  · Gastrointestinal. Esophageal reflux, heartburn, or gallbladder disease  · Anxiety and panic disorders  · Nerve compression and neuritis  · Miscellaneous problems such as aortic aneurysm or pulmonary embolism (a blood clot in the lungs)  Home care  After your visit, follow these  recommendations:  · Rest today and avoid strenuous activity.  · Take any prescribed medicine as directed.  · Be aware of any recurrent chest pain and notice any changes  Follow-up care  Follow up with your healthcare provider if you do not start to feel better within 24 hours, or as advised.  Call 911  Call 911 if any of these occur:  · A change in the type of pain: if it feels different, becomes more severe, lasts longer, or begins to spread into your shoulder, arm, neck, jaw or back  · Shortness of breath or increased pain with breathing  · Weakness, dizziness, or fainting  · Rapid heart beat  · Crushing sensation in your chest  When to seek medical advice  Call your healthcare provider right away if any of the following occur:  · Cough with dark colored sputum (phlegm) or blood  · Fever of 100.4ºF (38ºC) or higher, or as directed by your healthcare provider  · Swelling, pain or redness in one leg  · Shortness of breath  Date Last Reviewed: 12/30/2015  © 3821-4378 [employer] [address] [place] [address], [place] [address]. All rights reserved. This information is not intended as a substitute for professional medical care. Always follow your healthcare professional's instructions.

## 2019-11-07 NOTE — PATIENT INSTRUCTIONS
RED FLAGS/WARNING SYMPTOMS DISCUSSED WITH PATIENT THAT WOULD WARRANT EMERGENT MEDICAL ATTENTION. PATIENT VERBALIZED UNDERSTANDING.     Please follow up with your Primary care provider within 2-5 days if your signs and symptoms have not resolved or worsen.     If your condition worsens or fails to improve we recommend that you receive another evaluation at the emergency room immediately or contact your primary medical clinic to discuss your concerns.   You must understand that you have received an Urgent Care treatment only and that you may be released before all of your medical problems are known or treated. You, the patient, will arrange for follow up care as instructed.       Uncertain Causes of Chest Pain    Chest pain can happen for a number of reasons. Sometimes the cause can't be determined. If your condition does not seem serious, and your pain does not appear to be coming from your heart, your healthcare provider may recommend watching it closely. Sometimes the signs of a serious problem take more time to appear. Many problems not related to your heart can cause chest pain.These include:  · Musculoskeletal. Costochondritis, an inflammation of the tissues around the ribs that can occur from trauma or overuse injuries  · Respiratory. Pneumonia, pneumothorax, or pneumonitis (inflammation of the lining of the chest and lungs)  · Gastrointestinal. Esophageal reflux, heartburn, or gallbladder disease  · Anxiety and panic disorders  · Nerve compression and neuritis  · Miscellaneous problems such as aortic aneurysm or pulmonary embolism (a blood clot in the lungs)  Home care  After your visit, follow these recommendations:  · Rest today and avoid strenuous activity.  · Take any prescribed medicine as directed.  · Be aware of any recurrent chest pain and notice any changes  Follow-up care  Follow up with your healthcare provider if you do not start to feel better within 24 hours, or as advised.  Call 911  Call 911 if  any of these occur:  · A change in the type of pain: if it feels different, becomes more severe, lasts longer, or begins to spread into your shoulder, arm, neck, jaw or back  · Shortness of breath or increased pain with breathing  · Weakness, dizziness, or fainting  · Rapid heart beat  · Crushing sensation in your chest  When to seek medical advice  Call your healthcare provider right away if any of the following occur:  · Cough with dark colored sputum (phlegm) or blood  · Fever of 100.4ºF (38ºC) or higher, or as directed by your healthcare provider  · Swelling, pain or redness in one leg  · Shortness of breath  Date Last Reviewed: 12/30/2015  © 3031-5720 [employer]. [address] [place] [address], [place] [address]. All rights reserved. This information is not intended as a substitute for professional medical care. Always follow your healthcare professional's instructions.

## 2019-12-11 ENCOUNTER — OFFICE VISIT (OUTPATIENT)
Dept: URGENT CARE | Facility: CLINIC | Age: 39
End: 2019-12-11
Payer: COMMERCIAL

## 2019-12-11 VITALS
WEIGHT: 130 LBS | RESPIRATION RATE: 16 BRPM | OXYGEN SATURATION: 98 % | TEMPERATURE: 98 F | HEIGHT: 67 IN | HEART RATE: 75 BPM | DIASTOLIC BLOOD PRESSURE: 78 MMHG | SYSTOLIC BLOOD PRESSURE: 125 MMHG | BODY MASS INDEX: 20.4 KG/M2

## 2019-12-11 DIAGNOSIS — R05.9 COUGH: ICD-10-CM

## 2019-12-11 DIAGNOSIS — J01.40 ACUTE PANSINUSITIS, RECURRENCE NOT SPECIFIED: Primary | ICD-10-CM

## 2019-12-11 LAB
CTP QC/QA: YES
FLUAV AG NPH QL: NEGATIVE
FLUBV AG NPH QL: NEGATIVE

## 2019-12-11 PROCEDURE — 96372 THER/PROPH/DIAG INJ SC/IM: CPT | Mod: S$GLB,,, | Performed by: EMERGENCY MEDICINE

## 2019-12-11 PROCEDURE — 87804 POCT INFLUENZA A/B: ICD-10-PCS | Mod: QW,S$GLB,, | Performed by: EMERGENCY MEDICINE

## 2019-12-11 PROCEDURE — 96372 PR INJECTION,THERAP/PROPH/DIAG2ST, IM OR SUBCUT: ICD-10-PCS | Mod: S$GLB,,, | Performed by: EMERGENCY MEDICINE

## 2019-12-11 PROCEDURE — 99214 OFFICE O/P EST MOD 30 MIN: CPT | Mod: 25,S$GLB,, | Performed by: EMERGENCY MEDICINE

## 2019-12-11 PROCEDURE — 87804 INFLUENZA ASSAY W/OPTIC: CPT | Mod: QW,S$GLB,, | Performed by: EMERGENCY MEDICINE

## 2019-12-11 PROCEDURE — 99214 PR OFFICE/OUTPT VISIT, EST, LEVL IV, 30-39 MIN: ICD-10-PCS | Mod: 25,S$GLB,, | Performed by: EMERGENCY MEDICINE

## 2019-12-11 RX ORDER — BENZONATATE 100 MG/1
100 CAPSULE ORAL EVERY 6 HOURS PRN
Qty: 40 CAPSULE | Refills: 1 | Status: SHIPPED | OUTPATIENT
Start: 2019-12-11 | End: 2019-12-25

## 2019-12-11 RX ORDER — BETAMETHASONE SODIUM PHOSPHATE AND BETAMETHASONE ACETATE 3; 3 MG/ML; MG/ML
9 INJECTION, SUSPENSION INTRA-ARTICULAR; INTRALESIONAL; INTRAMUSCULAR; SOFT TISSUE
Status: COMPLETED | OUTPATIENT
Start: 2019-12-11 | End: 2019-12-11

## 2019-12-11 RX ADMIN — BETAMETHASONE SODIUM PHOSPHATE AND BETAMETHASONE ACETATE 9 MG: 3; 3 INJECTION, SUSPENSION INTRA-ARTICULAR; INTRALESIONAL; INTRAMUSCULAR; SOFT TISSUE at 09:12

## 2019-12-11 NOTE — PATIENT INSTRUCTIONS
Please return here or go to the Emergency Department for any concerns or worsening of condition      Start taking antibiotics if not improved in 3 days    Zyrtec, Claritin, or Allegra OTC as directed for the next 7 days    Flonase OTC as directed for the next 7 days    Salt Water Nasal Spray OTC 3x/day for the next 7 days    Tylenol 500mg 2 tabs by mouth every 8 hours  Motrin 200mg 2 tabs by mouth every 8 hours  Alternate Tylenol and Motrin every 4hours    Mucinex or Robitussin OTC as directed for cough    Sudafed as directed for runny nose and congestion        Follow up with Primary Care or ENT if not improved in 7-10 Days:  [id number]      Sinusitis (No Antibiotics)    The sinuses are air-filled spaces within the bones of the face. They connect to the inside of the nose. Sinusitis is an inflammation of the tissue lining the sinus cavity. Sinus inflammation can occur during a cold. It can also be due to allergies to pollens and other particles in the air. It can cause symptoms such as sinus congestion, headache, sore throat, facial swelling and fullness. It may also cause a low-grade fever. No infection is present, and no antibiotic treatment is needed.  Home care  · Drink plenty of water, hot tea, and other liquids. This may help thin mucus. It also may promote sinus drainage.  · Heat may help soothe painful areas of the face. Use a towel soaked in hot water. Or,  the shower and direct the hot spray onto your face. Using a vaporizer along with a menthol rub at night may also help.   · An expectorant containing guaifenesin may help thin the mucus and promote drainage from the sinuses.  · Over-the-counter decongestants may be used unless a similar medicine was prescribed. Nasal sprays work the fastest. Use one that contains phenylephrine or oxymetazoline. First blow the nose gently. Then use the spray. Do not use these medicines more often than directed on the label or symptoms may get worse. You may also use  tablets containing pseudoephedrine. Avoid products that combine ingredients, because side effects may be increased. Read labels. You can also ask the pharmacist for help. (NOTE: Persons with high blood pressure should not use decongestants. They can raise blood pressure.)  · Over-the-counter antihistamines may help if allergies contributed to your sinusitis.    · Use acetaminophen or ibuprofen to control pain, unless another pain medicine was prescribed. (If you have chronic liver or kidney disease or ever had a stomach ulcer, talk with your doctor before using these medicines. Aspirin should never be used in anyone under 18 years of age who is ill with a fever. It may cause severe liver damage.)  · Use nasal rinses or irrigation as instructed by your health care provider.  · Don't smoke. This can worsen symptoms.  Follow-up care  Follow up with your healthcare provider or our staff if you are not improving within the next week.  When to seek medical advice  Call your healthcare provider if any of these occur:  · Green or yellow discharge from the nose or into the throat  · Facial pain or headache becoming more severe  · Stiff neck  · Unusual drowsiness or confusion  · Swelling of the forehead or eyelids  · Vision problems, including blurred or double vision  · Fever of 100.4ºF (38ºC) or higher, or as directed by your healthcare provider  · Seizure  · Breathing problems  · Symptoms not resolving within 10 days  Date Last Reviewed: 4/13/2015  © 4599-1966 [employer]. [address] [place] [id number]. All rights reserved. This information is not intended as a substitute for professional medical care. Always follow your healthcare professional's instructions.      When to Use Antibiotics   Antibiotics are medicines used to treat infections caused by bacteria. They dont work for illnesses caused by viruses or an allergic reaction. In fact, taking antibiotics for reasons other than a bacterial  infection can cause problems. For example, you may have side effects from the medicine. And if you really need an antibiotic, it may not work well.                                                                                                                                              When antibiotics wont help  Your healthcare provider wont usually prescribe antibiotics for the following conditions. You can help by not asking for them if you have:   · A cold. This type of illness is caused by a virus. It can cause a runny nose, stuffed-up nose, sneezing, coughing, headache, mild body aches, and low fever. A cold gets better on its own in a few days to a week.  · The flu (influenza). This is a respiratory illness caused by a virus. The flu usually goes away on its own in a week or so. It can cause fever, body aches, sore throat, and fatigue.  · Bronchitis. This is an infection in the lungs most often caused by a virus. You may have coughing, phlegm, body aches, and a low fever. A common type of bronchitis is known as a chest cold (acute bronchitis). This often happens after you have a respiratory infection like a common cold. Bronchitis can take weeks to go away, but antibiotics usually dont help.  · Most sore throats. Sore throats are most often caused by viruses. Your throat may feel scratchy or achy, and it may hurt to swallow. You may also have a low fever and body aches. A sore throat usually gets better in a few days.  · Most ear infections. An ear infection may be caused by a virus or bacteria. It causes pain in the ear. Antibiotics usually dont help, and the infection goes away on its own.  · Most sinus infections (sinusitis). This kind of infection causes sinus pain and swelling, and a runny nose. In most cases, sinusitis goes away on its own, and antibiotics dont make recovery quicker.  · Allergic rhinitis. This is a set of symptoms caused by an allergic reaction. You may have sneezing, a runny  nose, itchy or watery eyes, or a sore throat. Allergies are not treated with antibiotics.  · Low fever. A mild fever thats less than 100.4°F (38°C) most likely doesnt need treatment with antibiotics.   When antibiotics can help   Antibiotics can be used to treat:                                                     · Strep throat. This is a throat infectioncaused by a certain type of bacteria. Symptoms of strep throat include a sore throat, white patches on the tonsils, red spots on the roof of the mouth, fever, body aches, and nausea and vomiting.  · Urinary tract infection (UTI). This is a bacterial infection of the bladder and the tube that takes urine out of the body. It can cause burning pain and urine thats cloudy or tinted with blood. UTIs are very common. Antibiotics usually help treat these infections.  · Some ear infections. In some cases, a healthcare provider may prescribe antibiotics for an ear infection. You may need a test to show whats causing the ear infection.  · Some sinus infections. In some cases, yourhealthcare provider may give you antibiotics. He or she may first need to make sure your symptoms arent caused by a virus, fungus, allergies, or air pollutants such as smoke.   Your doctor may also recommend antibiotics if you have a condition that can affect your immune system, such as diabetes or cancer.   Self-care at home   If your infection cant be treated with antibiotics, you can take other steps to feel better. Try the remedies below. In general:   · Rest and sleep as much as needed.  · Drink water and other clear fluids.  · Dont smoke, and avoid smoke from other people.  · Use over-the-counter medicine such as acetaminophen to ease pain or fever, as directed by your healthcare provider.   To treat sinus pain or nasal congestion:   · Put a warm, moist washcloth on your face where you feel sinus pain or pressure.  · Use a nasal spray with medicine or saline, as directed by your  "healthcare provider.  · Breathe in steam from a hot shower.  · Use a humidifier or cool mist vaporizer.   To quiet a cough:   · Use a humidifier or cool mist vaporizer.  · Breathe in steam from a hot shower.  · Use cough lozenges.   To sooth a sore throat:   · Suck on ice chips, popsicles, or lozenges.  · Use a sore throat spray.  · Use a humidifier or cool mist vaporizer.  · Gargle with saltwater.  · Drink warm liquids.   To ease ear pain:   · Hold a warm, moist washcloth on the ear for 10 minutes at a time.  Date Last Reviewed: 9/1/2016 © 2000-2016 [employer]. [address] [place] [address], [place] [address]. All rights reserved. This information is not intended as a substitute for professional medical care. Always follow your healthcare professional's instructions.           Understanding Nasal Anatomy: Inside View  A lot happens under the surface of the nose. The bone and cartilage under the skin give the nose most of its size and shape. Other structures inside and behind the nose help you breathe. Learning the anatomy of the nose can help you better understand how the nose works.       Bone supports the upper third (bridge) of the nose. The upper cartilage supports the side of the nose. The lower cartilage adds support, width, and height. It helps shape the nostrils and the tip of the nose. Skin also helps shape the nose.          The nasal cavity is a hollow space behind the nose that air flows through. The septum is a thin "wall" made of cartilage and bone. It divides the inside of the nose into two chambers. The mucous membrane is thin tissue that lines the nose, sinuses, and throat. It warms and moistens the air you breathe in. It also makes the sticky mucus that helps clean the air of dust and other small particles. The turbinates on each side of the nose are curved, bony ridges lined with mucous membrane. They warm and moisten the air you breathe in. The sinuses are hollow, air-filled chambers in " the bone around your nose. Mucus from the sinuses drains into the nasal cavity.  Date Last Reviewed: 11/1/2016  © 5236-2893 [facility]. [address] [place] [address], [place] [address]. All rights reserved. This information is not intended as a substitute for professional medical care. Always follow your healthcare professional's instructions.

## 2019-12-11 NOTE — PROGRESS NOTES
"Subjective:       Patient ID: Nannette Dimas is a 39 y.o. female.    Vitals:  height is 5' 7" (1.702 m) and weight is 59 kg (130 lb). Her temperature is 97.9 °F (36.6 °C). Her blood pressure is 125/78 and her pulse is 75. Her respiration is 16 and oxygen saturation is 98%.     Chief Complaint: Cough    Pt experiencing productive cough, body aches and chills x4 days.  Taken Mucinex and tylenol Cold & flu but experienced no relief.     Cough   This is a new problem. The current episode started in the past 7 days. The problem has been unchanged. The cough is productive of purulent sputum. Associated symptoms include chills and myalgias. Pertinent negatives include no chest pain, fever, rash, sore throat or shortness of breath. Treatments tried: Mucinex. Tylenol Cold last taken at 6AM. The treatment provided no relief.       Constitution: Positive for chills. Negative for fatigue and fever.   HENT: Positive for congestion. Negative for sore throat.    Neck: Negative for painful lymph nodes.   Cardiovascular: Negative for chest pain and leg swelling.   Eyes: Negative for double vision and blurred vision.   Respiratory: Positive for cough and sputum production. Negative for shortness of breath.    Gastrointestinal: Negative for nausea, vomiting and diarrhea.   Genitourinary: Negative for dysuria, frequency, urgency and history of kidney stones.   Musculoskeletal: Positive for muscle ache. Negative for joint pain, joint swelling and muscle cramps.   Skin: Negative for color change, pale, rash and bruising.   Allergic/Immunologic: Negative for seasonal allergies.   Neurological: Negative for dizziness, history of vertigo, light-headedness and passing out.   Hematologic/Lymphatic: Negative for swollen lymph nodes.   Psychiatric/Behavioral: Negative for nervous/anxious, sleep disturbance and depression. The patient is not nervous/anxious.        Objective:      Physical Exam   Constitutional: She is oriented to person, " place, and time. She appears well-developed and well-nourished. She is cooperative.  Non-toxic appearance. She does not have a sickly appearance. She does not appear ill. No distress.   HENT:   Head: Normocephalic and atraumatic.   Right Ear: Hearing, tympanic membrane, external ear and ear canal normal.   Left Ear: Hearing, tympanic membrane, external ear and ear canal normal.   Nose: Mucosal edema and rhinorrhea present. No nasal deformity. No epistaxis. Right sinus exhibits maxillary sinus tenderness and frontal sinus tenderness. Left sinus exhibits maxillary sinus tenderness and frontal sinus tenderness.   Mouth/Throat: Uvula is midline, oropharynx is clear and moist and mucous membranes are normal. No trismus in the jaw. Normal dentition. No uvula swelling. No oropharyngeal exudate, posterior oropharyngeal edema or posterior oropharyngeal erythema.   Eyes: Conjunctivae and lids are normal. No scleral icterus.   Neck: Trachea normal, full passive range of motion without pain and phonation normal. Neck supple. No neck rigidity. No edema and no erythema present.   Cardiovascular: Normal rate, regular rhythm, normal heart sounds, intact distal pulses and normal pulses.   Pulmonary/Chest: Effort normal and breath sounds normal. No respiratory distress. She has no decreased breath sounds. She has no rhonchi.   Abdominal: Normal appearance.   Musculoskeletal: Normal range of motion. She exhibits no edema or deformity.   Neurological: She is alert and oriented to person, place, and time. She exhibits normal muscle tone. Coordination normal.   Skin: Skin is warm, dry, intact, not diaphoretic and not pale.   Psychiatric: She has a normal mood and affect. Her speech is normal and behavior is normal. Judgment and thought content normal. Cognition and memory are normal.   Nursing note and vitals reviewed.        Assessment:       1. Acute pansinusitis, recurrence not specified    2. Cough        Plan:         Acute  pansinusitis, recurrence not specified    Cough  -     POCT Influenza A/B    Other orders  -     benzonatate (TESSALON PERLES) 100 MG capsule; Take 1 capsule (100 mg total) by mouth every 6 (six) hours as needed for Cough.  Dispense: 40 capsule; Refill: 1          Patient Instructions   Please return here or go to the Emergency Department for any concerns or worsening of condition      Start taking antibiotics if not improved in 3 days    Zyrtec, Claritin, or Allegra OTC as directed for the next 7 days    Flonase OTC as directed for the next 7 days    Salt Water Nasal Spray OTC 3x/day for the next 7 days    Tylenol 500mg 2 tabs by mouth every 8 hours  Motrin 200mg 2 tabs by mouth every 8 hours  Alternate Tylenol and Motrin every 4hours    Mucinex or Robitussin OTC as directed for cough    Sudafed as directed for runny nose and congestion        Follow up with Primary Care or ENT if not improved in 7-10 Days:  [id number]      Sinusitis (No Antibiotics)    The sinuses are air-filled spaces within the bones of the face. They connect to the inside of the nose. Sinusitis is an inflammation of the tissue lining the sinus cavity. Sinus inflammation can occur during a cold. It can also be due to allergies to pollens and other particles in the air. It can cause symptoms such as sinus congestion, headache, sore throat, facial swelling and fullness. It may also cause a low-grade fever. No infection is present, and no antibiotic treatment is needed.  Home care  · Drink plenty of water, hot tea, and other liquids. This may help thin mucus. It also may promote sinus drainage.  · Heat may help soothe painful areas of the face. Use a towel soaked in hot water. Or,  the shower and direct the hot spray onto your face. Using a vaporizer along with a menthol rub at night may also help.   · An expectorant containing guaifenesin may help thin the mucus and promote drainage from the sinuses.  · Over-the-counter decongestants may be  used unless a similar medicine was prescribed. Nasal sprays work the fastest. Use one that contains phenylephrine or oxymetazoline. First blow the nose gently. Then use the spray. Do not use these medicines more often than directed on the label or symptoms may get worse. You may also use tablets containing pseudoephedrine. Avoid products that combine ingredients, because side effects may be increased. Read labels. You can also ask the pharmacist for help. (NOTE: Persons with high blood pressure should not use decongestants. They can raise blood pressure.)  · Over-the-counter antihistamines may help if allergies contributed to your sinusitis.    · Use acetaminophen or ibuprofen to control pain, unless another pain medicine was prescribed. (If you have chronic liver or kidney disease or ever had a stomach ulcer, talk with your doctor before using these medicines. Aspirin should never be used in anyone under 18 years of age who is ill with a fever. It may cause severe liver damage.)  · Use nasal rinses or irrigation as instructed by your health care provider.  · Don't smoke. This can worsen symptoms.  Follow-up care  Follow up with your healthcare provider or our staff if you are not improving within the next week.  When to seek medical advice  Call your healthcare provider if any of these occur:  · Green or yellow discharge from the nose or into the throat  · Facial pain or headache becoming more severe  · Stiff neck  · Unusual drowsiness or confusion  · Swelling of the forehead or eyelids  · Vision problems, including blurred or double vision  · Fever of 100.4ºF (38ºC) or higher, or as directed by your healthcare provider  · Seizure  · Breathing problems  · Symptoms not resolving within 10 days  Date Last Reviewed: 4/13/2015 © 2000-2016 [employer]. [address] [place] [address], [place] [address]. All rights reserved. This information is not intended as a substitute for professional medical care. Always  follow your healthcare professional's instructions.      When to Use Antibiotics   Antibiotics are medicines used to treat infections caused by bacteria. They dont work for illnesses caused by viruses or an allergic reaction. In fact, taking antibiotics for reasons other than a bacterial infection can cause problems. For example, you may have side effects from the medicine. And if you really need an antibiotic, it may not work well.                                                                                                                                              When antibiotics wont help  Your healthcare provider wont usually prescribe antibiotics for the following conditions. You can help by not asking for them if you have:   · A cold. This type of illness is caused by a virus. It can cause a runny nose, stuffed-up nose, sneezing, coughing, headache, mild body aches, and low fever. A cold gets better on its own in a few days to a week.  · The flu (influenza). This is a respiratory illness caused by a virus. The flu usually goes away on its own in a week or so. It can cause fever, body aches, sore throat, and fatigue.  · Bronchitis. This is an infection in the lungs most often caused by a virus. You may have coughing, phlegm, body aches, and a low fever. A common type of bronchitis is known as a chest cold (acute bronchitis). This often happens after you have a respiratory infection like a common cold. Bronchitis can take weeks to go away, but antibiotics usually dont help.  · Most sore throats. Sore throats are most often caused by viruses. Your throat may feel scratchy or achy, and it may hurt to swallow. You may also have a low fever and body aches. A sore throat usually gets better in a few days.  · Most ear infections. An ear infection may be caused by a virus or bacteria. It causes pain in the ear. Antibiotics usually dont help, and the infection goes away on its own.  · Most sinus infections  (sinusitis). This kind of infection causes sinus pain and swelling, and a runny nose. In most cases, sinusitis goes away on its own, and antibiotics dont make recovery quicker.  · Allergic rhinitis. This is a set of symptoms caused by an allergic reaction. You may have sneezing, a runny nose, itchy or watery eyes, or a sore throat. Allergies are not treated with antibiotics.  · Low fever. A mild fever thats less than 100.4°F (38°C) most likely doesnt need treatment with antibiotics.   When antibiotics can help   Antibiotics can be used to treat:                                                     · Strep throat. This is a throat infectioncaused by a certain type of bacteria. Symptoms of strep throat include a sore throat, white patches on the tonsils, red spots on the roof of the mouth, fever, body aches, and nausea and vomiting.  · Urinary tract infection (UTI). This is a bacterial infection of the bladder and the tube that takes urine out of the body. It can cause burning pain and urine thats cloudy or tinted with blood. UTIs are very common. Antibiotics usually help treat these infections.  · Some ear infections. In some cases, a healthcare provider may prescribe antibiotics for an ear infection. You may need a test to show whats causing the ear infection.  · Some sinus infections. In some cases, yourhealthcare provider may give you antibiotics. He or she may first need to make sure your symptoms arent caused by a virus, fungus, allergies, or air pollutants such as smoke.   Your doctor may also recommend antibiotics if you have a condition that can affect your immune system, such as diabetes or cancer.   Self-care at home   If your infection cant be treated with antibiotics, you can take other steps to feel better. Try the remedies below. In general:   · Rest and sleep as much as needed.  · Drink water and other clear fluids.  · Dont smoke, and avoid smoke from other people.  · Use over-the-counter  "medicine such as acetaminophen to ease pain or fever, as directed by your healthcare provider.   To treat sinus pain or nasal congestion:   · Put a warm, moist washcloth on your face where you feel sinus pain or pressure.  · Use a nasal spray with medicine or saline, as directed by your healthcare provider.  · Breathe in steam from a hot shower.  · Use a humidifier or cool mist vaporizer.   To quiet a cough:   · Use a humidifier or cool mist vaporizer.  · Breathe in steam from a hot shower.  · Use cough lozenges.   To sooth a sore throat:   · Suck on ice chips, popsicles, or lozenges.  · Use a sore throat spray.  · Use a humidifier or cool mist vaporizer.  · Gargle with saltwater.  · Drink warm liquids.   To ease ear pain:   · Hold a warm, moist washcloth on the ear for 10 minutes at a time.  Date Last Reviewed: 9/1/2016  © 8224-1422 [employer]. [address] [place] [address]. All rights reserved. This information is not intended as a substitute for professional medical care. Always follow your healthcare professional's instructions.           Understanding Nasal Anatomy: Inside View  A lot happens under the surface of the nose. The bone and cartilage under the skin give the nose most of its size and shape. Other structures inside and behind the nose help you breathe. Learning the anatomy of the nose can help you better understand how the nose works.       Bone supports the upper third (bridge) of the nose. The upper cartilage supports the side of the nose. The lower cartilage adds support, width, and height. It helps shape the nostrils and the tip of the nose. Skin also helps shape the nose.          The nasal cavity is a hollow space behind the nose that air flows through. The septum is a thin "wall" made of cartilage and bone. It divides the inside of the nose into two chambers. The mucous membrane is thin tissue that lines the nose, sinuses, and throat. It warms and moistens the air you " breathe in. It also makes the sticky mucus that helps clean the air of dust and other small particles. The turbinates on each side of the nose are curved, bony ridges lined with mucous membrane. They warm and moisten the air you breathe in. The sinuses are hollow, air-filled chambers in the bone around your nose. Mucus from the sinuses drains into the nasal cavity.  Date Last Reviewed: 11/1/2016 © 2000-2016 [employer]. [address] [place] [address]. All rights reserved. This information is not intended as a substitute for professional medical care. Always follow your healthcare professional's instructions.

## 2020-01-22 ENCOUNTER — PATIENT OUTREACH (OUTPATIENT)
Dept: ADMINISTRATIVE | Facility: HOSPITAL | Age: 40
End: 2020-01-22

## 2020-01-22 NOTE — PROGRESS NOTES
Pre-visit chart review completed.  Immunizations reviewed and updated.    Patient due for the following    Lipid Panel     HIV Screening     Pap Smear     Influenza Vaccine (1)     Patient new to provider.

## 2020-02-05 ENCOUNTER — OFFICE VISIT (OUTPATIENT)
Dept: PRIMARY CARE CLINIC | Facility: CLINIC | Age: 40
End: 2020-02-05
Payer: COMMERCIAL

## 2020-02-05 VITALS
WEIGHT: 132.06 LBS | TEMPERATURE: 98 F | RESPIRATION RATE: 19 BRPM | DIASTOLIC BLOOD PRESSURE: 77 MMHG | HEIGHT: 67 IN | BODY MASS INDEX: 20.73 KG/M2 | OXYGEN SATURATION: 98 % | HEART RATE: 70 BPM | SYSTOLIC BLOOD PRESSURE: 110 MMHG

## 2020-02-05 DIAGNOSIS — Z13.220 SCREENING FOR LIPOID DISORDERS: ICD-10-CM

## 2020-02-05 DIAGNOSIS — Z23 NEED FOR INFLUENZA VACCINATION: ICD-10-CM

## 2020-02-05 DIAGNOSIS — Z00.00 NORMAL PHYSICAL EXAM, ROUTINE: Primary | ICD-10-CM

## 2020-02-05 PROCEDURE — 99999 PR PBB SHADOW E&M-EST. PATIENT-LVL III: ICD-10-PCS | Mod: PBBFAC,,, | Performed by: INTERNAL MEDICINE

## 2020-02-05 PROCEDURE — 90471 IMMUNIZATION ADMIN: CPT | Mod: S$GLB,,, | Performed by: INTERNAL MEDICINE

## 2020-02-05 PROCEDURE — 90686 FLU VACCINE (QUAD) GREATER THAN OR EQUAL TO 3YO PRESERVATIVE FREE IM: ICD-10-PCS | Mod: S$GLB,,, | Performed by: INTERNAL MEDICINE

## 2020-02-05 PROCEDURE — 99385 PREV VISIT NEW AGE 18-39: CPT | Mod: 25,S$GLB,, | Performed by: INTERNAL MEDICINE

## 2020-02-05 PROCEDURE — 90686 IIV4 VACC NO PRSV 0.5 ML IM: CPT | Mod: S$GLB,,, | Performed by: INTERNAL MEDICINE

## 2020-02-05 PROCEDURE — 90471 FLU VACCINE (QUAD) GREATER THAN OR EQUAL TO 3YO PRESERVATIVE FREE IM: ICD-10-PCS | Mod: S$GLB,,, | Performed by: INTERNAL MEDICINE

## 2020-02-05 PROCEDURE — 99385 PR PREVENTIVE VISIT,NEW,18-39: ICD-10-PCS | Mod: 25,S$GLB,, | Performed by: INTERNAL MEDICINE

## 2020-02-05 PROCEDURE — 99999 PR PBB SHADOW E&M-EST. PATIENT-LVL III: CPT | Mod: PBBFAC,,, | Performed by: INTERNAL MEDICINE

## 2020-02-05 NOTE — PROGRESS NOTES
[facility] Note    Chief Complaint      Chief Complaint   Patient presents with    [facility]    Annual Exam       History of Present Illness      Nannette Dimas is a 39 y.o. WF with High Risk HPV presents to est PCP and for well visit    HCM - Flu - 20;  Tdap - '18;  Gardasil - none;  MGM - 2019 - dense breast tissue - repeat 40; HIV Screen - '18?; PAP -  - wnl per pt; h/o Colpo - neg per pt;  C-scope - none;  Prev PCP - none; Derm - Dr. Quiñones;  Ob/GYN - Dr. Pickett;  Ophtho - [facility]    Past Medical History:  Past Medical History:   Diagnosis Date    Congenital scoliosis     High risk HPV infection     History of postpartum depression        Past Surgical History:   has a past surgical history that includes Spinal fusion;  section; and Finger fracture surgery (Right).    Family History:  family history includes Basal cell carcinoma in her maternal grandfather; Hyperlipidemia in her mother; No Known Problems in her father; Stroke (age of onset: 40) in her maternal aunt and maternal grandmother.     Social History:  Social History     Tobacco Use    Smoking status: Never Smoker    Smokeless tobacco: Never Used   Substance Use Topics    Alcohol use: Yes     Frequency: 2-4 times a month     Drinks per session: 1 or 2    Drug use: Never       Review of Systems   Constitutional: Negative for chills, diaphoresis and fever.   HENT: Negative for hearing loss and tinnitus.    Eyes: Negative for blurred vision and double vision.        Wears glasses   Respiratory: Negative for cough, shortness of breath and wheezing.    Cardiovascular: Negative for chest pain, palpitations and leg swelling.        No CP recently.  She reports CP 2 mos ago that radiated down her left arm while bathing her kids and was eval in ED with neg EKG.    Gastrointestinal: Negative for abdominal pain, blood in stool, constipation, diarrhea, heartburn, melena, nausea and vomiting.  "  Genitourinary: Negative for dysuria and frequency.   Musculoskeletal: Positive for back pain. Negative for joint pain and myalgias.        Occ low back pain   Skin: Negative for itching and rash.   Neurological: Negative for dizziness and headaches.   Endo/Heme/Allergies: Negative for polydipsia. Does not bruise/bleed easily.   Psychiatric/Behavioral: Negative for depression. The patient is not nervous/anxious.         Medications:  Outpatient Encounter Medications as of 2/5/2020   Medication Sig Note Dispense Refill    acetaminophen (TYLENOL) 325 MG tablet Take 325 mg by mouth every 6 (six) hours as needed for Pain.       BOOSTRIX TDAP 2.5-8-5 Lf-mcg-Lf/0.5mL Susp ADM 0.5ML IM UTD 1/24/2018: Received from: External Pharmacy  0    [DISCONTINUED] venlafaxine (EFFEXOR XR) 75 MG 24 hr capsule Take 1 capsule (75 mg total) by mouth once daily. (Patient not taking: Reported on 2/5/2020)  30 capsule 3     No facility-administered encounter medications on file as of 2/5/2020.        Current Outpatient Medications:     acetaminophen (TYLENOL) 325 MG tablet, Take 325 mg by mouth every 6 (six) hours as needed for Pain., Disp: , Rfl:     BOOSTRIX TDAP 2.5-8-5 Lf-mcg-Lf/0.5mL Susp, ADM 0.5ML IM UTD, Disp: , Rfl: 0    Allergies:  Review of patient's allergies indicates:   Allergen Reactions    Ampicillin Rash       Health Maintenance:  Immunization History   Administered Date(s) Administered    Influenza 10/23/2014, 10/10/2017    Influenza - Quadrivalent - PF (6 months and older) 02/05/2020    Tdap 01/02/2018      Health Maintenance   Topic Date Due    Lipid Panel  1981    Pap Smear  08/17/2002    TETANUS VACCINE  01/02/2028      Objective:      Vital Signs  Temp: 98 °F (36.7 °C)  Temp src: Oral  Pulse: 70  Resp: 19  SpO2: 98 %  BP: 110/77  BP Location: Right arm  Patient Position: Sitting  Pain Score: 0-No pain  Height and Weight  Height: 5' 7" (170.2 cm)  Weight: 59.9 kg (132 lb 0.9 oz)  BSA (Calculated - sq " m): 1.68 sq meters  BMI (Calculated): 20.7  Weight in (lb) to have BMI = 25: 159.3]    Laboratory:    URINALYSIS:  Recent Labs   Lab 05/07/19  1820   pH, UA 7.0      Physical Exam   Constitutional: She is oriented to person, place, and time. She appears well-developed and well-nourished. No distress.   HENT:   Head: Normocephalic and atraumatic.   Right Ear: External ear normal.   Left Ear: External ear normal.   Mouth/Throat: Oropharynx is clear and moist.   Eyes: Pupils are equal, round, and reactive to light. EOM are normal.   Neck: Normal range of motion. Neck supple. No thyromegaly present.   Cardiovascular: Normal rate, regular rhythm, normal heart sounds and intact distal pulses.   No murmur heard.  Pulmonary/Chest: Effort normal and breath sounds normal. No respiratory distress.   Abdominal: Soft. Bowel sounds are normal. She exhibits no distension.   Musculoskeletal: Normal range of motion.   Lymphadenopathy:     She has no cervical adenopathy.   Neurological: She is alert and oriented to person, place, and time.   Skin: Skin is warm and dry. She is not diaphoretic.   Psychiatric: She has a normal mood and affect.   Nursing note and vitals reviewed.          Assessment:       1. Normal physical exam, routine    2. Screening for lipoid disorders    3. Need for influenza vaccination        Nannette Dimas is a 39 y.o.female with:    1. Normal physical exam, routine  - CBC auto differential; Future  - Comprehensive metabolic panel; Future  - T4, free; Future  - TSH; Future  -Performed today.  Will check Basic labs.  RTC in 1 yr for fu or sooner if needed    2. Screening for lipoid disorders  - Lipid panel; Future    3. Need for influenza vaccination  -Admin today       Chronic conditions status updated as per HPI.  Other than changes above, cont current medications and maintain follow up with specialists.  Return to clinic in 12 months for well visit or sooner if needed.    [facility]  [facility]

## 2020-02-05 NOTE — PROGRESS NOTES
Two patient identifiers verified.  Allergies reviewed.  Flu 0.5 IM administered to left deltoid per MD order.  Patient tolerated injection well; no redness, bleeding, or bruising noted to injection site.  Patient instructed to remain in clinic setting for 15 minutes.  Verbalizes understanding.

## 2020-02-07 ENCOUNTER — LAB VISIT (OUTPATIENT)
Dept: LAB | Facility: HOSPITAL | Age: 40
End: 2020-02-07
Attending: INTERNAL MEDICINE
Payer: COMMERCIAL

## 2020-02-07 DIAGNOSIS — Z13.220 SCREENING FOR LIPOID DISORDERS: ICD-10-CM

## 2020-02-07 DIAGNOSIS — Z00.00 NORMAL PHYSICAL EXAM, ROUTINE: ICD-10-CM

## 2020-02-07 LAB
ALBUMIN SERPL BCP-MCNC: 4.1 G/DL (ref 3.5–5.2)
ALP SERPL-CCNC: 44 U/L (ref 55–135)
ALT SERPL W/O P-5'-P-CCNC: 12 U/L (ref 10–44)
ANION GAP SERPL CALC-SCNC: 9 MMOL/L (ref 8–16)
AST SERPL-CCNC: 17 U/L (ref 10–40)
BASOPHILS # BLD AUTO: 0.05 K/UL (ref 0–0.2)
BASOPHILS NFR BLD: 1.3 % (ref 0–1.9)
BILIRUB SERPL-MCNC: 1.2 MG/DL (ref 0.1–1)
BUN SERPL-MCNC: 20 MG/DL (ref 6–20)
CALCIUM SERPL-MCNC: 9.2 MG/DL (ref 8.7–10.5)
CHLORIDE SERPL-SCNC: 107 MMOL/L (ref 95–110)
CHOLEST SERPL-MCNC: 143 MG/DL (ref 120–199)
CHOLEST/HDLC SERPL: 2.1 {RATIO} (ref 2–5)
CO2 SERPL-SCNC: 25 MMOL/L (ref 23–29)
CREAT SERPL-MCNC: 0.9 MG/DL (ref 0.5–1.4)
DIFFERENTIAL METHOD: ABNORMAL
EOSINOPHIL # BLD AUTO: 0.1 K/UL (ref 0–0.5)
EOSINOPHIL NFR BLD: 1.3 % (ref 0–8)
ERYTHROCYTE [DISTWIDTH] IN BLOOD BY AUTOMATED COUNT: 13.2 % (ref 11.5–14.5)
EST. GFR  (AFRICAN AMERICAN): >60 ML/MIN/1.73 M^2
EST. GFR  (NON AFRICAN AMERICAN): >60 ML/MIN/1.73 M^2
GLUCOSE SERPL-MCNC: 71 MG/DL (ref 70–110)
HCT VFR BLD AUTO: 47.5 % (ref 37–48.5)
HDLC SERPL-MCNC: 67 MG/DL (ref 40–75)
HDLC SERPL: 46.9 % (ref 20–50)
HGB BLD-MCNC: 14.5 G/DL (ref 12–16)
IMM GRANULOCYTES # BLD AUTO: 0.01 K/UL (ref 0–0.04)
IMM GRANULOCYTES NFR BLD AUTO: 0.3 % (ref 0–0.5)
LDLC SERPL CALC-MCNC: 66.8 MG/DL (ref 63–159)
LYMPHOCYTES # BLD AUTO: 1.8 K/UL (ref 1–4.8)
LYMPHOCYTES NFR BLD: 44.4 % (ref 18–48)
MCH RBC QN AUTO: 30.6 PG (ref 27–31)
MCHC RBC AUTO-ENTMCNC: 30.5 G/DL (ref 32–36)
MCV RBC AUTO: 100 FL (ref 82–98)
MONOCYTES # BLD AUTO: 0.5 K/UL (ref 0.3–1)
MONOCYTES NFR BLD: 12.6 % (ref 4–15)
NEUTROPHILS # BLD AUTO: 1.6 K/UL (ref 1.8–7.7)
NEUTROPHILS NFR BLD: 40.1 % (ref 38–73)
NONHDLC SERPL-MCNC: 76 MG/DL
NRBC BLD-RTO: 0 /100 WBC
PLATELET # BLD AUTO: 301 K/UL (ref 150–350)
PMV BLD AUTO: 9.9 FL (ref 9.2–12.9)
POTASSIUM SERPL-SCNC: 4.9 MMOL/L (ref 3.5–5.1)
PROT SERPL-MCNC: 7.3 G/DL (ref 6–8.4)
RBC # BLD AUTO: 4.74 M/UL (ref 4–5.4)
SODIUM SERPL-SCNC: 141 MMOL/L (ref 136–145)
T4 FREE SERPL-MCNC: 0.94 NG/DL (ref 0.71–1.51)
TRIGL SERPL-MCNC: 46 MG/DL (ref 30–150)
TSH SERPL DL<=0.005 MIU/L-ACNC: 0.99 UIU/ML (ref 0.4–4)
WBC # BLD AUTO: 3.96 K/UL (ref 3.9–12.7)

## 2020-02-07 PROCEDURE — 85025 COMPLETE CBC W/AUTO DIFF WBC: CPT

## 2020-02-07 PROCEDURE — 80053 COMPREHEN METABOLIC PANEL: CPT

## 2020-02-07 PROCEDURE — 84443 ASSAY THYROID STIM HORMONE: CPT

## 2020-02-07 PROCEDURE — 36415 COLL VENOUS BLD VENIPUNCTURE: CPT | Mod: PN

## 2020-02-07 PROCEDURE — 84439 ASSAY OF FREE THYROXINE: CPT

## 2020-02-07 PROCEDURE — 80061 LIPID PANEL: CPT

## 2020-02-14 DIAGNOSIS — E80.6 HYPERBILIRUBINEMIA: Primary | ICD-10-CM

## 2020-02-14 NOTE — PROGRESS NOTES
Reviewed labs. Please inform pt -   TFT's wnl.  Cholesterol is controlled. Her kidney function is wnl.  Total bilirubin level was 1.2 - just above normal.  Will repeat in 6 wks.  I put in order. She is not anemic.    Dr. BRAN

## 2020-05-12 ENCOUNTER — LAB VISIT (OUTPATIENT)
Dept: PRIMARY CARE CLINIC | Facility: CLINIC | Age: 40
End: 2020-05-12
Payer: COMMERCIAL

## 2020-05-12 DIAGNOSIS — Z00.6 RESEARCH STUDY PATIENT: Primary | ICD-10-CM

## 2020-05-12 LAB — SARS-COV-2 IGG SERPLBLD QL IA.RAPID: NEGATIVE

## 2020-05-12 PROCEDURE — U0002 COVID-19 LAB TEST NON-CDC: HCPCS

## 2020-05-12 PROCEDURE — 86769 SARS-COV-2 COVID-19 ANTIBODY: CPT

## 2020-05-12 NOTE — PROGRESS NOTES
Date of Consent: 05/12/2020      Sponsor: [facility]    Study Title/IRB Number: Observational study of Sars-CoV2 Immunoglobulin G (IgG) seroprevalence among the [facility] population over time 2020.163  Principle Investigator: Lina Ball, PhD    Did the patient need translation services? No   name: N/A    Prior to the Informed Consent (IC) being signed, or any study protocol required data collection, testing, procedure, or intervention being performed, the following was done and/or discussed:   Patient was given a paper copy of the IC for review    Patient was given study FAQ   Purpose of the study and qualifications to participate    Study design and tests or procedures done at this visit   Confidentiality and HIPAA Authorization for Release of Medical Records for the research trial/ subject's rights/research related injury   Risk, Benefits, Alternative Treatments, Compensation and Costs   Participation in the research trial is voluntary and patient may withdraw at anytime   Contact information for study related questions    Patient verbalizes understanding of the above: Yes  Contact information for PI and IRB given to patient: Yes  Patient able to adequately summarize: the purpose of the study, the risks associated with the study, and all procedures, testing, and follow-ups associated with the study: Yes    The consent was discussed verbally with the patient and all questions were answered satisfactorily. Patient gave verbal consent for the Seroprevalence research study with an IRB approval date of 05/08/2020.      The Consent, Consent Witness and name of Clinical Research Coordinator consenting was captured and documented in [employer].    All Inclusion and Exclusion Criteria reviewed, subject meets all Inclusion criteria and does not meet any Exclusion Criteria at this time.     Patient Eligibility was confirmed.    Patient responded to survey questions.    The following biospecimen  collection procedures were collected:    -Nasopharyngeal Swab Collection  -Blood collection

## 2020-05-12 NOTE — RESEARCH
Date of Consent: 05/12/2020     Sponsor: [facility]    Study Title/IRB Number: Observational study of Sars-CoV2 Immunoglobulin G (IgG) seroprevalence among the [facility] population over time 2020.163  Principle Investigator: Lina Ball, PhD    Did the patient need translation services? No   name: N/A    Prior to the Informed Consent (IC) being signed, or any study protocol required data collection, testing, procedure, or intervention being performed, the following was done and/or discussed:   Patient was given a paper copy of the IC for review    Patient was given study FAQ   Purpose of the study and qualifications to participate    Study design and tests or procedures done at this visit   Confidentiality and HIPAA Authorization for Release of Medical Records for the research trial/ subject's rights/research related injury   Risk, Benefits, Alternative Treatments, Compensation and Costs   Participation in the research trial is voluntary and patient may withdraw at anytime   Contact information for study related questions    Patient verbalizes understanding of the above: Yes  Contact information for PI and IRB given to patient: Yes  Patient able to adequately summarize: the purpose of the study, the risks associated with the study, and all procedures, testing, and follow-ups associated with the study: Yes    The consent was discussed verbally with the patient and all questions were answered satisfactorily. Patient gave verbal consent for the Seroprevalence research study with an IRB approval date of 05/08/2020.      The Consent, Consent Witness and name of Clinical Research Coordinator consenting was captured and documented in [employer].    All Inclusion and Exclusion Criteria reviewed, subject meets all Inclusion criteria and does not meet any Exclusion Criteria at this time.     Patient Eligibility was confirmed.    Patient responded to survey questions.    The following biospecimen  collection procedures were collected:    -Nasopharyngeal Swab Collection  -Blood collection

## 2020-05-13 LAB — SARS-COV-2 RNA RESP QL NAA+PROBE: NOT DETECTED

## 2020-10-15 ENCOUNTER — TELEPHONE (OUTPATIENT)
Dept: PRIMARY CARE CLINIC | Facility: CLINIC | Age: 40
End: 2020-10-15

## 2020-11-01 ENCOUNTER — OFFICE VISIT (OUTPATIENT)
Dept: URGENT CARE | Facility: CLINIC | Age: 40
End: 2020-11-01
Payer: COMMERCIAL

## 2020-11-01 VITALS
OXYGEN SATURATION: 98 % | SYSTOLIC BLOOD PRESSURE: 120 MMHG | TEMPERATURE: 98 F | HEART RATE: 76 BPM | WEIGHT: 132 LBS | BODY MASS INDEX: 20.72 KG/M2 | HEIGHT: 67 IN | RESPIRATION RATE: 18 BRPM | DIASTOLIC BLOOD PRESSURE: 75 MMHG

## 2020-11-01 DIAGNOSIS — N30.00 ACUTE CYSTITIS WITHOUT HEMATURIA: Primary | ICD-10-CM

## 2020-11-01 LAB
B-HCG UR QL: NEGATIVE
BILIRUB UR QL STRIP: POSITIVE
CTP QC/QA: YES
GLUCOSE UR QL STRIP: NEGATIVE
KETONES UR QL STRIP: NEGATIVE
LEUKOCYTE ESTERASE UR QL STRIP: NEGATIVE
PH, POC UA: 6.5 (ref 5–8)
POC BLOOD, URINE: NEGATIVE
POC NITRATES, URINE: NEGATIVE
PROT UR QL STRIP: NEGATIVE
SP GR UR STRIP: 1.01 (ref 1–1.03)
UROBILINOGEN UR STRIP-ACNC: POSITIVE (ref 0.1–1.1)

## 2020-11-01 PROCEDURE — 99214 OFFICE O/P EST MOD 30 MIN: CPT | Mod: 25,S$GLB,, | Performed by: FAMILY MEDICINE

## 2020-11-01 PROCEDURE — 81003 URINALYSIS AUTO W/O SCOPE: CPT | Mod: QW,S$GLB,, | Performed by: FAMILY MEDICINE

## 2020-11-01 PROCEDURE — 81003 POCT URINALYSIS, DIPSTICK, AUTOMATED, W/O SCOPE: ICD-10-PCS | Mod: QW,S$GLB,, | Performed by: FAMILY MEDICINE

## 2020-11-01 PROCEDURE — 99214 PR OFFICE/OUTPT VISIT, EST, LEVL IV, 30-39 MIN: ICD-10-PCS | Mod: 25,S$GLB,, | Performed by: FAMILY MEDICINE

## 2020-11-01 RX ORDER — NITROFURANTOIN 25; 75 MG/1; MG/1
100 CAPSULE ORAL 2 TIMES DAILY
Qty: 10 CAPSULE | Refills: 0 | Status: SHIPPED | OUTPATIENT
Start: 2020-11-01 | End: 2020-11-06

## 2020-11-01 NOTE — PROGRESS NOTES
"Subjective:       Patient ID: Nannette Dimas is a 40 y.o. female.    Vitals:  height is 5' 7" (1.702 m) and weight is 59.9 kg (132 lb). Her temperature is 98.4 °F (36.9 °C). Her blood pressure is 120/75 and her pulse is 76. Her respiration is 18 and oxygen saturation is 98%.     Chief Complaint: Dysuria    Pt presents complaining of dysuria starting this morning 11/01. Pt complains of a burning sensation during urination and increased frequency. PMH includes chronic UTIs- feel similar for her. Pt has taken Azos with mild relief.  No fever back pain hematuria    Dysuria   This is a chronic problem. The current episode started acute onset. The problem occurs every urination. The problem has been gradually worsening. The quality of the pain is described as burning. The pain is moderate. There has been no fever. The fever has been present for less than 1 day. She is sexually active. There is no history of pyelonephritis. Associated symptoms include frequency. Pertinent negatives include no chills, hematuria, nausea, urgency, vomiting or rash. She has tried antibiotics for the symptoms. The treatment provided mild relief. Her past medical history is significant for recurrent UTIs.       Constitution: Negative for chills and fever.   Neck: Negative for painful lymph nodes.   Gastrointestinal: Negative for abdominal pain, nausea and vomiting.   Genitourinary: Positive for dysuria and frequency. Negative for urgency, urine decreased, hematuria, history of kidney stones, painful menstruation, irregular menstruation, missed menses, heavy menstrual bleeding, ovarian cysts, genital trauma, vaginal pain, vaginal discharge, vaginal bleeding, vaginal odor, painful intercourse, genital sore, painful ejaculation and pelvic pain.   Musculoskeletal: Negative for back pain.   Skin: Negative for rash and lesion.   Hematologic/Lymphatic: Negative for swollen lymph nodes.       Objective:      Physical Exam   Constitutional: She is " oriented to person, place, and time. She appears well-developed.   HENT:   Head: Normocephalic and atraumatic.   Ears:   Right Ear: External ear normal.   Left Ear: External ear normal.   Nose: Nose normal. No nasal deformity. No epistaxis.   Mouth/Throat: Oropharynx is clear and moist and mucous membranes are normal.   Eyes: Lids are normal.   Neck: Trachea normal, normal range of motion and phonation normal. Neck supple.   Cardiovascular: Normal pulses.   Pulmonary/Chest: Effort normal.   Abdominal: Soft. Normal appearance and bowel sounds are normal. She exhibits no distension. There is abdominal tenderness in the suprapubic area. There is no rebound, no guarding, no tenderness at McBurney's point, negative Arroyo's sign, no left CVA tenderness and no right CVA tenderness.   Neurological: She is alert and oriented to person, place, and time.   Skin: Skin is warm, dry and intact. Psychiatric: Her speech is normal and behavior is normal.   Nursing note and vitals reviewed.    Results for orders placed or performed in visit on 11/01/20   POCT urine pregnancy   Result Value Ref Range    POC Preg Test, Ur Negative Negative     Acceptable Yes    POCT Urinalysis, Dipstick, Automated, W/O Scope   Result Value Ref Range    POC Blood, Urine Negative Negative    POC Bilirubin, Urine Positive (A) Negative    POC Urobilinogen, Urine Positive 0.1 - 1.1    POC Ketones, Urine Negative Negative    POC Protein, Urine Negative Negative    POC Nitrates, Urine Negative Negative    POC Glucose, Urine Negative Negative    pH, UA 6.5 5 - 8    POC Specific Gravity, Urine 1.010 1.003 - 1.029    POC Leukocytes, Urine Negative Negative           Assessment:       1. Acute cystitis without hematuria        Plan:         Acute cystitis without hematuria  -     POCT urine pregnancy  -     POCT Urinalysis, Dipstick, Automated, W/O Scope  -     nitrofurantoin, macrocrystal-monohydrate, (MACROBID) 100 MG capsule; Take 1 capsule  (100 mg total) by mouth 2 (two) times daily. for 5 days  Dispense: 10 capsule; Refill: 0          Patient Instructions     PLEASE READ YOUR DISCHARGE INSTRUCTIONS ENTIRELY AS IT CONTAINS IMPORTANT INFORMATION.      Take the antibiotics to completion.     Drink plenty of fluids, wipe front to back, take showers not baths, no scented soaps, wear breathable cotton underwear, urinate after sexual intercourse.     Can continue azo    Please return or go to the ER for worsening symptoms including fever, worsening flank pain, vomiting, etc.       Please return or see your primary care doctor if you develop new or worsening symptoms.     Please arrange follow up with your primary medical clinic as soon as possible. You must understand that you've received an Urgent Care treatment only and that you may be released before all of your medical problems are known or treated. You, the patient, will arrange for follow up as instructed. If your symptoms worsen or fail to improve you should go to the Emergency Room.  WE CANNOT RULE OUT ALL POSSIBLE CAUSES OF YOUR SYMPTOMS IN THE URGENT CARE SETTING PLEASE GO TO THE ER IF YOU FEELS YOUR CONDITION IS WORSENING OR YOU WOULD LIKE EMERGENT EVALUATION.      Understanding Urinary Tract Infections (UTIs)  Most UTIs are caused by bacteria, although they may also be caused by viruses or fungi. Bacteria from the bowel are the most common source of infection. The infection may start because of any of the following:  · Sexual activity. During sex, bacteria can travel from the penis, vagina, or rectum into the urethra.   · Bacteria on the skin outside the rectum may travel into the urethra. This is more common in women since the rectum and urethra are closer to each other than in men. Wiping from front to back after using the toilet and keeping the area clean can help prevent germs from getting to the urethra.  · Blockage of urine flow through the urinary tract. If urine sits too long, germs may  start to grow out of control.      Parts of the urinary tract  The infection can occur in any part of the urinary tract.  · The kidneys collect and store urine.  · The ureters carry urine from the kidneys to the bladder.  · The bladder holds urine until you are ready to let it out.  · The urethra carries urine from the bladder out of the body. It is shorter in women, so bacteria can move through it more easily. The urethra is longer in men, so a UTI is less likely to reach the bladder or kidneys in men.  Date Last Reviewed: 1/1/2017 © 2000-2017 [employer]. [address] [place] [id number]. All rights reserved. This information is not intended as a substitute for professional medical care. Always follow your healthcare professional's instructions.

## 2020-11-01 NOTE — PATIENT INSTRUCTIONS
PLEASE READ YOUR DISCHARGE INSTRUCTIONS ENTIRELY AS IT CONTAINS IMPORTANT INFORMATION.      Take the antibiotics to completion.     Drink plenty of fluids, wipe front to back, take showers not baths, no scented soaps, wear breathable cotton underwear, urinate after sexual intercourse.     Can continue azo    Please return or go to the ER for worsening symptoms including fever, worsening flank pain, vomiting, etc.       Please return or see your primary care doctor if you develop new or worsening symptoms.     Please arrange follow up with your primary medical clinic as soon as possible. You must understand that you've received an Urgent Care treatment only and that you may be released before all of your medical problems are known or treated. You, the patient, will arrange for follow up as instructed. If your symptoms worsen or fail to improve you should go to the Emergency Room.  WE CANNOT RULE OUT ALL POSSIBLE CAUSES OF YOUR SYMPTOMS IN THE URGENT CARE SETTING PLEASE GO TO THE ER IF YOU FEELS YOUR CONDITION IS WORSENING OR YOU WOULD LIKE EMERGENT EVALUATION.      Understanding Urinary Tract Infections (UTIs)  Most UTIs are caused by bacteria, although they may also be caused by viruses or fungi. Bacteria from the bowel are the most common source of infection. The infection may start because of any of the following:  · Sexual activity. During sex, bacteria can travel from the penis, vagina, or rectum into the urethra.   · Bacteria on the skin outside the rectum may travel into the urethra. This is more common in women since the rectum and urethra are closer to each other than in men. Wiping from front to back after using the toilet and keeping the area clean can help prevent germs from getting to the urethra.  · Blockage of urine flow through the urinary tract. If urine sits too long, germs may start to grow out of control.      Parts of the urinary tract  The infection can occur in any part of the urinary  tract.  · The kidneys collect and store urine.  · The ureters carry urine from the kidneys to the bladder.  · The bladder holds urine until you are ready to let it out.  · The urethra carries urine from the bladder out of the body. It is shorter in women, so bacteria can move through it more easily. The urethra is longer in men, so a UTI is less likely to reach the bladder or kidneys in men.  Date Last Reviewed: 1/1/2017 © 2000-2017 [employer]. [address] [place] [address], [place] [address]. All rights reserved. This information is not intended as a substitute for professional medical care. Always follow your healthcare professional's instructions.

## 2021-01-16 ENCOUNTER — CLINICAL SUPPORT (OUTPATIENT)
Dept: URGENT CARE | Facility: CLINIC | Age: 41
End: 2021-01-16
Payer: COMMERCIAL

## 2021-01-16 VITALS — TEMPERATURE: 98 F | HEART RATE: 78 BPM | RESPIRATION RATE: 18 BRPM | OXYGEN SATURATION: 98 %

## 2021-01-16 DIAGNOSIS — Z11.9 SCREENING EXAMINATION FOR UNSPECIFIED INFECTIOUS DISEASE: Primary | ICD-10-CM

## 2021-01-16 LAB
CTP QC/QA: YES
SARS-COV-2 RDRP RESP QL NAA+PROBE: NEGATIVE

## 2021-01-16 PROCEDURE — U0002: ICD-10-PCS | Mod: QW,S$GLB,, | Performed by: EMERGENCY MEDICINE

## 2021-01-16 PROCEDURE — U0002 COVID-19 LAB TEST NON-CDC: HCPCS | Mod: QW,S$GLB,, | Performed by: EMERGENCY MEDICINE

## 2021-03-10 ENCOUNTER — IMMUNIZATION (OUTPATIENT)
Dept: FAMILY MEDICINE | Facility: CLINIC | Age: 41
End: 2021-03-10
Payer: COMMERCIAL

## 2021-03-10 DIAGNOSIS — Z23 NEED FOR VACCINATION: Primary | ICD-10-CM

## 2021-03-10 PROCEDURE — 91300 COVID-19, MRNA, LNP-S, PF, 30 MCG/0.3 ML DOSE VACCINE: CPT | Mod: PBBFAC | Performed by: FAMILY MEDICINE

## 2021-04-01 ENCOUNTER — IMMUNIZATION (OUTPATIENT)
Dept: FAMILY MEDICINE | Facility: CLINIC | Age: 41
End: 2021-04-01
Payer: COMMERCIAL

## 2021-04-01 DIAGNOSIS — Z23 NEED FOR VACCINATION: Primary | ICD-10-CM

## 2021-04-01 PROCEDURE — 0002A COVID-19, MRNA, LNP-S, PF, 30 MCG/0.3 ML DOSE VACCINE: CPT | Mod: PBBFAC | Performed by: FAMILY MEDICINE

## 2021-04-01 PROCEDURE — 91300 COVID-19, MRNA, LNP-S, PF, 30 MCG/0.3 ML DOSE VACCINE: CPT | Mod: PBBFAC | Performed by: FAMILY MEDICINE

## 2021-04-05 ENCOUNTER — PATIENT MESSAGE (OUTPATIENT)
Dept: ADMINISTRATIVE | Facility: HOSPITAL | Age: 41
End: 2021-04-05

## 2021-07-06 ENCOUNTER — PATIENT MESSAGE (OUTPATIENT)
Dept: ADMINISTRATIVE | Facility: HOSPITAL | Age: 41
End: 2021-07-06

## 2021-10-05 ENCOUNTER — PATIENT MESSAGE (OUTPATIENT)
Dept: ADMINISTRATIVE | Facility: HOSPITAL | Age: 41
End: 2021-10-05

## 2022-01-18 ENCOUNTER — PATIENT MESSAGE (OUTPATIENT)
Dept: ADMINISTRATIVE | Facility: HOSPITAL | Age: 42
End: 2022-01-18
Payer: COMMERCIAL

## 2022-10-03 ENCOUNTER — OUTSIDE PLACE OF SERVICE (OUTPATIENT)
Dept: URGENT CARE | Facility: CLINIC | Age: 42
End: 2022-10-03

## 2022-10-03 ENCOUNTER — OUTSIDE PLACE OF SERVICE (OUTPATIENT)
Dept: URGENT CARE | Facility: CLINIC | Age: 42
End: 2022-10-03
Payer: COMMERCIAL

## 2022-10-03 DIAGNOSIS — N39.0 URINARY TRACT INFECTION, SITE NOT SPECIFIED: Primary | ICD-10-CM

## 2022-10-03 PROCEDURE — 99212 OFFICE O/P EST SF 10 MIN: CPT | Mod: 95,,, | Performed by: NURSE PRACTITIONER

## 2022-10-03 PROCEDURE — 99212 PR OFFICE/OUTPT VISIT, EST, LEVL II, 10-19 MIN: ICD-10-PCS | Mod: 95,,, | Performed by: NURSE PRACTITIONER
